# Patient Record
Sex: FEMALE | Race: WHITE | NOT HISPANIC OR LATINO | Employment: FULL TIME | URBAN - NONMETROPOLITAN AREA
[De-identification: names, ages, dates, MRNs, and addresses within clinical notes are randomized per-mention and may not be internally consistent; named-entity substitution may affect disease eponyms.]

---

## 2018-01-12 ENCOUNTER — OFFICE VISIT CONVERTED (OUTPATIENT)
Dept: FAMILY MEDICINE CLINIC | Age: 33
End: 2018-01-12
Attending: NURSE PRACTITIONER

## 2018-03-28 ENCOUNTER — OFFICE VISIT CONVERTED (OUTPATIENT)
Dept: OTOLARYNGOLOGY | Facility: CLINIC | Age: 33
End: 2018-03-28
Attending: OTOLARYNGOLOGY

## 2018-09-20 ENCOUNTER — OFFICE VISIT CONVERTED (OUTPATIENT)
Dept: FAMILY MEDICINE CLINIC | Age: 33
End: 2018-09-20
Attending: NURSE PRACTITIONER

## 2019-07-30 ENCOUNTER — OFFICE VISIT CONVERTED (OUTPATIENT)
Dept: FAMILY MEDICINE CLINIC | Age: 34
End: 2019-07-30
Attending: NURSE PRACTITIONER

## 2019-09-13 ENCOUNTER — HOSPITAL ENCOUNTER (OUTPATIENT)
Dept: OTHER | Facility: HOSPITAL | Age: 34
Discharge: HOME | End: 2019-09-13

## 2019-09-13 LAB
ALBUMIN SERPL-MCNC: 4.4 G/DL (ref 3.5–5)
ALBUMIN/GLOB SERPL: 2 {RATIO} (ref 1.4–2.6)
ALP SERPL-CCNC: 85 U/L (ref 42–98)
ALT SERPL-CCNC: 41 U/L (ref 10–40)
ANION GAP SERPL CALC-SCNC: 19 MMOL/L (ref 8–19)
AST SERPL-CCNC: 24 U/L (ref 15–50)
BASOPHILS # BLD AUTO: 0.08 10*3/UL (ref 0–0.2)
BASOPHILS NFR BLD AUTO: 1.2 % (ref 0–3)
BILIRUB SERPL-MCNC: 0.39 MG/DL (ref 0.2–1.3)
BUN SERPL-MCNC: 14 MG/DL (ref 5–25)
BUN/CREAT SERPL: 16 {RATIO} (ref 6–20)
CALCIUM SERPL-MCNC: 9 MG/DL (ref 8.7–10.4)
CHLORIDE SERPL-SCNC: 108 MMOL/L (ref 99–111)
CHOLEST SERPL-MCNC: 140 MG/DL (ref 107–200)
CHOLEST/HDLC SERPL: 3.3 {RATIO} (ref 3–6)
CONV ABS IMM GRAN: 0.02 10*3/UL (ref 0–0.2)
CONV CO2: 19 MMOL/L (ref 22–32)
CONV IMMATURE GRAN: 0.3 % (ref 0–1.8)
CONV TOTAL PROTEIN: 6.6 G/DL (ref 6.3–8.2)
CREAT UR-MCNC: 0.86 MG/DL (ref 0.5–0.9)
DEPRECATED RDW RBC AUTO: 39.4 FL (ref 36.4–46.3)
EOSINOPHIL # BLD AUTO: 0.63 10*3/UL (ref 0–0.7)
EOSINOPHIL # BLD AUTO: 9.5 % (ref 0–7)
ERYTHROCYTE [DISTWIDTH] IN BLOOD BY AUTOMATED COUNT: 12.6 % (ref 11.7–14.4)
GFR SERPLBLD BASED ON 1.73 SQ M-ARVRAT: >60 ML/MIN/{1.73_M2}
GLOBULIN UR ELPH-MCNC: 2.2 G/DL (ref 2–3.5)
GLUCOSE SERPL-MCNC: 84 MG/DL (ref 65–99)
HCT VFR BLD AUTO: 43 % (ref 37–47)
HDLC SERPL-MCNC: 42 MG/DL (ref 40–60)
HGB BLD-MCNC: 14.3 G/DL (ref 12–16)
LDLC SERPL CALC-MCNC: 84 MG/DL (ref 70–100)
LYMPHOCYTES # BLD AUTO: 1.69 10*3/UL (ref 1–5)
LYMPHOCYTES NFR BLD AUTO: 25.5 % (ref 20–45)
MCH RBC QN AUTO: 28.8 PG (ref 27–31)
MCHC RBC AUTO-ENTMCNC: 33.3 G/DL (ref 33–37)
MCV RBC AUTO: 86.5 FL (ref 81–99)
MONOCYTES # BLD AUTO: 0.42 10*3/UL (ref 0.2–1.2)
MONOCYTES NFR BLD AUTO: 6.3 % (ref 3–10)
NEUTROPHILS # BLD AUTO: 3.79 10*3/UL (ref 2–8)
NEUTROPHILS NFR BLD AUTO: 57.2 % (ref 30–85)
NRBC CBCN: 0 % (ref 0–0.7)
OSMOLALITY SERPL CALC.SUM OF ELEC: 294 MOSM/KG (ref 273–304)
PLATELET # BLD AUTO: 417 10*3/UL (ref 130–400)
PMV BLD AUTO: 8.8 FL (ref 9.4–12.3)
POTASSIUM SERPL-SCNC: 4 MMOL/L (ref 3.5–5.3)
RBC # BLD AUTO: 4.97 10*6/UL (ref 4.2–5.4)
SODIUM SERPL-SCNC: 142 MMOL/L (ref 135–147)
T4 FREE SERPL-MCNC: 1.3 NG/DL (ref 0.9–1.8)
TRIGL SERPL-MCNC: 68 MG/DL (ref 40–150)
TSH SERPL-ACNC: 1.34 M[IU]/L (ref 0.27–4.2)
VLDLC SERPL-MCNC: 14 MG/DL (ref 5–37)
WBC # BLD AUTO: 6.63 10*3/UL (ref 4.8–10.8)

## 2019-09-14 LAB — T3FREE SERPL-MCNC: 3.6 PG/ML (ref 2–4.4)

## 2019-09-26 ENCOUNTER — OFFICE VISIT CONVERTED (OUTPATIENT)
Dept: FAMILY MEDICINE CLINIC | Age: 34
End: 2019-09-26
Attending: NURSE PRACTITIONER

## 2020-01-07 ENCOUNTER — OFFICE VISIT CONVERTED (OUTPATIENT)
Dept: FAMILY MEDICINE CLINIC | Age: 35
End: 2020-01-07
Attending: FAMILY MEDICINE

## 2020-09-08 ENCOUNTER — HOSPITAL ENCOUNTER (OUTPATIENT)
Dept: OTHER | Facility: HOSPITAL | Age: 35
Discharge: HOME | End: 2020-09-08

## 2020-09-08 ENCOUNTER — HOSPITAL ENCOUNTER (OUTPATIENT)
Dept: OTHER | Facility: HOSPITAL | Age: 35
Discharge: HOME | End: 2020-09-08
Attending: NURSE PRACTITIONER

## 2020-09-08 LAB
25(OH)D3 SERPL-MCNC: 50.6 NG/ML (ref 30–100)
ALBUMIN SERPL-MCNC: 4.1 G/DL (ref 3.5–5)
ALBUMIN/GLOB SERPL: 1.6 {RATIO} (ref 1.4–2.6)
ALP SERPL-CCNC: 78 U/L (ref 42–98)
ALT SERPL-CCNC: 43 U/L (ref 10–40)
ANION GAP SERPL CALC-SCNC: 18 MMOL/L (ref 8–19)
AST SERPL-CCNC: 28 U/L (ref 15–50)
BASOPHILS # BLD AUTO: 0.1 10*3/UL (ref 0–0.2)
BASOPHILS NFR BLD AUTO: 1.1 % (ref 0–3)
BILIRUB SERPL-MCNC: 0.52 MG/DL (ref 0.2–1.3)
BUN SERPL-MCNC: 12 MG/DL (ref 5–25)
BUN/CREAT SERPL: 13 {RATIO} (ref 6–20)
CALCIUM SERPL-MCNC: 9 MG/DL (ref 8.7–10.4)
CHLORIDE SERPL-SCNC: 106 MMOL/L (ref 99–111)
CHOLEST SERPL-MCNC: 162 MG/DL (ref 107–200)
CHOLEST/HDLC SERPL: 3.9 {RATIO} (ref 3–6)
CONV ABS IMM GRAN: 0.02 10*3/UL (ref 0–0.2)
CONV CO2: 21 MMOL/L (ref 22–32)
CONV IMMATURE GRAN: 0.2 % (ref 0–1.8)
CONV TOTAL PROTEIN: 6.7 G/DL (ref 6.3–8.2)
CREAT UR-MCNC: 0.95 MG/DL (ref 0.5–0.9)
DEPRECATED RDW RBC AUTO: 39.5 FL (ref 36.4–46.3)
EOSINOPHIL # BLD AUTO: 0.48 10*3/UL (ref 0–0.7)
EOSINOPHIL # BLD AUTO: 5.2 % (ref 0–7)
ERYTHROCYTE [DISTWIDTH] IN BLOOD BY AUTOMATED COUNT: 12.8 % (ref 11.7–14.4)
FERRITIN SERPL-MCNC: 34 NG/ML (ref 10–200)
GFR SERPLBLD BASED ON 1.73 SQ M-ARVRAT: >60 ML/MIN/{1.73_M2}
GLOBULIN UR ELPH-MCNC: 2.6 G/DL (ref 2–3.5)
GLUCOSE SERPL-MCNC: 78 MG/DL (ref 65–99)
HCT VFR BLD AUTO: 45.3 % (ref 37–47)
HDLC SERPL-MCNC: 42 MG/DL (ref 40–60)
HGB BLD-MCNC: 15 G/DL (ref 12–16)
IRON SATN MFR SERPL: 30 % (ref 20–55)
IRON SERPL-MCNC: 132 UG/DL (ref 60–170)
LDLC SERPL CALC-MCNC: 99 MG/DL (ref 70–100)
LYMPHOCYTES # BLD AUTO: 2.6 10*3/UL (ref 1–5)
LYMPHOCYTES NFR BLD AUTO: 28 % (ref 20–45)
MCH RBC QN AUTO: 28.4 PG (ref 27–31)
MCHC RBC AUTO-ENTMCNC: 33.1 G/DL (ref 33–37)
MCV RBC AUTO: 85.8 FL (ref 81–99)
MONOCYTES # BLD AUTO: 0.55 10*3/UL (ref 0.2–1.2)
MONOCYTES NFR BLD AUTO: 5.9 % (ref 3–10)
NEUTROPHILS # BLD AUTO: 5.52 10*3/UL (ref 2–8)
NEUTROPHILS NFR BLD AUTO: 59.6 % (ref 30–85)
NRBC CBCN: 0 % (ref 0–0.7)
OSMOLALITY SERPL CALC.SUM OF ELEC: 291 MOSM/KG (ref 273–304)
PLATELET # BLD AUTO: 377 10*3/UL (ref 130–400)
PMV BLD AUTO: 8.7 FL (ref 9.4–12.3)
POTASSIUM SERPL-SCNC: 4.2 MMOL/L (ref 3.5–5.3)
RBC # BLD AUTO: 5.28 10*6/UL (ref 4.2–5.4)
SODIUM SERPL-SCNC: 141 MMOL/L (ref 135–147)
TIBC SERPL-MCNC: 445 UG/DL (ref 245–450)
TRANSFERRIN SERPL-MCNC: 311 MG/DL (ref 250–380)
TRIGL SERPL-MCNC: 104 MG/DL (ref 40–150)
TSH SERPL-ACNC: 1.38 M[IU]/L (ref 0.27–4.2)
VIT B12 SERPL-MCNC: 492 PG/ML (ref 211–911)
VLDLC SERPL-MCNC: 21 MG/DL (ref 5–37)
WBC # BLD AUTO: 9.27 10*3/UL (ref 4.8–10.8)

## 2020-09-21 ENCOUNTER — OFFICE VISIT CONVERTED (OUTPATIENT)
Dept: FAMILY MEDICINE CLINIC | Age: 35
End: 2020-09-21
Attending: NURSE PRACTITIONER

## 2020-10-21 ENCOUNTER — OFFICE VISIT CONVERTED (OUTPATIENT)
Dept: FAMILY MEDICINE CLINIC | Age: 35
End: 2020-10-21
Attending: NURSE PRACTITIONER

## 2020-12-17 ENCOUNTER — OFFICE VISIT CONVERTED (OUTPATIENT)
Dept: FAMILY MEDICINE CLINIC | Age: 35
End: 2020-12-17
Attending: NURSE PRACTITIONER

## 2021-01-05 ENCOUNTER — HOSPITAL ENCOUNTER (OUTPATIENT)
Dept: OTHER | Facility: HOSPITAL | Age: 36
Discharge: HOME | End: 2021-01-05
Attending: NURSE PRACTITIONER

## 2021-01-06 ENCOUNTER — OFFICE VISIT CONVERTED (OUTPATIENT)
Dept: OTOLARYNGOLOGY | Facility: CLINIC | Age: 36
End: 2021-01-06
Attending: OTOLARYNGOLOGY

## 2021-01-12 ENCOUNTER — HOSPITAL ENCOUNTER (OUTPATIENT)
Dept: ULTRASOUND IMAGING | Facility: HOSPITAL | Age: 36
Discharge: HOME | End: 2021-01-12
Attending: OTOLARYNGOLOGY

## 2021-05-14 VITALS — HEIGHT: 67 IN | BODY MASS INDEX: 32.96 KG/M2 | TEMPERATURE: 97.3 F | WEIGHT: 210 LBS

## 2021-05-14 NOTE — PROGRESS NOTES
Progress Note      Patient Name: Joelle Clement   Patient ID: [id number]   Sex: Female   YOB: 1985    Primary Care Provider: Florence SCHUSTER   Referring Provider: Florence SCHUSTER    Visit Date: January 6, 2021    Provider: Josafat Freire MD   Location: [facility] [place] [place] [facility]   Location Address: [address] [place] [address]  [id number]   Location Phone: [phone]          Chief Complaint     1.  Multinodular thyroid goiter       History Of Present Illness  Joelle Clement is a 35 year old /White female who presents to the office today for an established patient visit.      She presents the clinic today for follow-up regarding multinodular thyroid goiter and enlargement in the size of her thyroid nodules.  I last saw her for this in March 2018, and had planned a 1 year follow-up.  For whatever reason, this did not happen, and she presents today with a recent ultrasound of her thyroid that reveals stable size thyroid with slight increase in the size of her thyroid nodule including a 1.7 x 1 x 1.3 cm lobulated mixed hypo and hyperechoic right thyroid nodule with no internal calcifications.  This was characterized as a T RADS 4 lesion.  There is no adjacent 1.5 x 0.6 x 1.2 cm nodule with no internal calcifications and was also rated as a T RADS 4 nodule.  There are several other subcentimeter nodules.  Biopsy of the above-mentioned nodules was recommended by the radiologist.  Other nodules were felt to be benign.  She denies any significant change in her symptoms and has not had any voice changes throat pain, dysphagia, or fluctuations in her weight or energy levels.  Her TSH level was recently assessed and was within normal limits.  She does have a family history of multinodular thyroid goiters, but no family history of thyroid cancer.       Past Medical History  Depression; Thyroid nodule         Past Surgical  "History  Adenoidectomy; Tonsillectomy; Aimwell Tooth Extraction         Medication List  buspirone 7.5 mg oral tablet; fluoxetine 10 mg oral capsule; Hair, Skin, Nails with Biotin 7.5-7.5-1,250 mg-unit-mcg oral tablet,chewable; ibuprofen 200 mg oral tablet; Linzess 145 mcg oral capsule; phentermine 37.5 mg oral capsule; Wellbutrin  mg oral tablet extended release 24 hr         Allergy List  NO KNOWN DRUG ALLERGIES       Allergies Reconciled  Family Medical History  Family history of cancer         Social History  Tobacco (Never)         Review of Systems  · Constitutional  o Denies  o : fever, night sweats, weight loss  · Eyes  o Denies  o : discharge from eye, impaired vision  · HENT  o Admits  o : *See HPI  · Cardiovascular  o Denies  o : chest pain, irregular heart beats  · Respiratory  o Denies  o : shortness of breath, wheezing, coughing up blood  · Gastrointestinal  o Denies  o : heartburn, reflux, vomiting blood  · Genitourinary  o Denies  o : frequency  · Integument  o Denies  o : rash, skin dryness  · Neurologic  o Denies  o : seizures, loss of balance, loss of consciousness, dizziness  · Endocrine  o Denies  o : cold intolerance, heat intolerance  · Heme-Lymph  o Denies  o : easy bleeding, anemia      Vitals  Date Time BP Position Site L\R Cuff Size HR RR TEMP (F) WT  HT  BMI kg/m2 BSA m2 O2 Sat FR L/min FiO2 HC       01/06/2021 12:55 PM        97.3 210lbs 0oz 5'  7\" 32.89 2.12             Physical Examination  · Constitutional  o Appearance  o : well developed, well-nourished, alert and in no acute distress, voice clear and strong  · Head and Face  o Head  o :   § Inspection  § : no deformities or lesions  o Face  o :   § Inspection  § : No facial lesions; House-Brackmann I/VI bilaterally  § Palpation  § : No TMJ crepitus nor  muscle tenderness bilaterally  · Eyes  o Vision  o :   § Visual Fields  § : Extraocular movements are intact. No spontaneous or gaze-induced " nystagmus.  o Conjunctivae  o : clear  o Sclerae  o : clear  o Pupils and Irises  o : pupils equal, round, and reactive to light.   · Ears, Nose, Mouth and Throat  o Ears  o :   § External Ears  § : appearance within normal limits, no lesions present  § Otoscopic Examination  § : tympanic membrane appearance within normal limits bilaterally without perforations, well-aerated middle ears  § Hearing  § : intact to conversational voice both ears  o Nose  o :   § External Nose  § : appearance normal  § Intranasal Exam  § : mucosa within normal limits, vestibules normal, no intranasal lesions present, septum midline, sinuses non tender to percussion  o Oral Cavity  o :   § Oral Mucosa  § : oral mucosa normal without pallor or cyanosis  § Lips  § : lip appearance normal  § Teeth  § : normal dentition for age  § Gums  § : gums pink, non-swollen, no bleeding present  § Tongue  § : tongue appearance normal; normal mobility  § Palate  § : hard palate normal, soft palate appearance normal with symmetric mobility  o Throat  o :   § Oropharynx  § : no inflammation or lesions present, tonsils within normal limits  § Hypopharynx  § : appearance within normal limits, superior epiglottis within normal limits  § Larynx  § : appearance within normal limits, vocal cords within normal limits, no lesions present  · Neck  o Inspection/Palpation  o : normal appearance, no masses or tenderness, trachea midline; thyroid size normal, nontender, readily palpable thyroid nodules, no overlying skin changes, no lymphadenopathy  · Respiratory  o Respiratory Effort  o : breathing unlabored  o Inspection of Chest  o : normal appearance, no retractions  · Cardiovascular  o Heart  o : regular rate and rhythm  · Lymphatic  o Neck  o : no lymphadenopathy present  o Supraclavicular Nodes  o : no lymphadenopathy present  o Preauricular Nodes  o : no lymphadenopathy present  · Skin and Subcutaneous Tissue  o General Inspection  o : Regarding face and neck  - there are no rashes present, no lesions present, and no areas of discoloration  · Neurologic  o Cranial Nerves  o : cranial nerves II-XII are grossly intact bilaterally  o Gait and Station  o : normal gait, able to stand without diffculty  · Psychiatric  o Judgement and Insight  o : judgment and insight intact  o Mood and Affect  o : mood normal, affect appropriate          Assessment  · Thyroid nodule     241.0/E04.1  · Multinodular goiter     241.1/E04.2      Plan  · Orders  o FNA Thyroid w/ guidance (41196) - 241.1/E04.2, 241.0/E04.1 - 01/06/2021  · Medications  o Medications have been Reconciled  o Transition of Care or Provider Policy  · Instructions  o She presents the clinic today for follow-up regarding multinodular thyroid goiter and enlargement in the size of her thyroid nodules. I last saw her for this in March 2018, and had planned a 1 year follow-up. For whatever reason, this did not happen, and she presents today with a recent ultrasound of her thyroid that reveals stable size thyroid with slight increase in the size of her thyroid nodule including a 1.7 x 1 x 1.3 cm lobulated mixed hypo and hyperechoic right thyroid nodule with no internal calcifications. This was characterized as a T RADS 4 lesion. There is no adjacent 1.5 x 0.6 x 1.2 cm nodule with no internal calcifications and was also rated as a T RADS 4 nodule. There are several other subcentimeter nodules. Biopsy of the above-mentioned nodules was recommended by the radiologist. Other nodules were felt to be benign. She denies any significant change in her symptoms and has not had any voice changes throat pain, dysphagia, or fluctuations in her weight or energy levels. Her TSH level was recently assessed and was within normal limits. She does have a family history of multinodular thyroid goiters, but no family history of thyroid cancer. On exam today the nodules are readily palpable and are soft and mobile. In discussing the findings with  her, I did recommend a second opinion to have the radiology reviewed and if these are truly T RADS 4 nodules, I think it would be technically feasible to biopsy. I have ordered FNA for this. I will see her back in the clinic after the results are in to discuss further management.  o Electronically Identified Patient Education Materials Provided Electronically  · Correspondence  o ENT Letter to Referring MD (Florence SCHUSTER) - 01/06/2021            Electronically Signed by: Josafat Freire MD -Author on January 6, 2021 01:35:13 PM

## 2021-05-16 VITALS — TEMPERATURE: 97.8 F | WEIGHT: 186.5 LBS | HEIGHT: 67 IN | BODY MASS INDEX: 29.27 KG/M2

## 2021-05-18 NOTE — PROGRESS NOTES
Joelle Clement. 1985     Office/Outpatient Visit    Visit Date: Thu, Sep 20, 2018 04:42 pm    Provider: Florence Simmons N.P. (Assistant: Sania Musa LPN)    Location: [facility]        Electronically signed by Florence Simmons N.P. on  09/24/2018 04:55:02 PM                             SUBJECTIVE:        CC:     Ms. Clement is a 32 year old White female.  Preventative Exam;         HPI:         Health checkup noted.  Her last physical exam was 1 year ago.  Her last menstrual period was 08/2018-BCP every 3 months.  Her current method of contraception is birth control pills.  She performs breast self-exams occasionally.    Her last Pap smear was in 4/21/17 and was normal.   Preventative Health updated today.  She is not current with her influenza immunization.  Ms. Clement denies any history of abnormal Pap smears.          Additionally, she presents with history of constipation.  states dealing with constipation for years. She has tried many otc meds without much relief. It will produce small BM's and she feels there is more remaining to be removed. She has tried linzess and could not take on a diaily basis. When she took when off work, states it would work but felt it was a lot of diarrhea. She still felt there was remaining stool to be removed.          With regard to the depression with anxiety, states doing well on med. Here for f/u and refills.      ROS:     CONSTITUTIONAL:  Negative for chills, fatigue, fever, and weight change.      EYES:  Negative for blurred vision.      CARDIOVASCULAR:  Negative for chest pain, orthopnea, paroxysmal nocturnal dyspnea and pedal edema.      RESPIRATORY:  Negative for dyspnea.      GASTROINTESTINAL:  Positive for constipation.   Negative for abdominal pain, diarrhea, nausea or vomiting.      NEUROLOGICAL:  Negative for dizziness, headaches, paresthesias, and weakness.      PSYCHIATRIC:  Positive for feelings of stress.          PMH/FMH/SH:      Last Reviewed on 9/20/2018 05:20 PM by Florence Simmons    Past Medical History:             GYNECOLOGICAL HISTORY:    G0    Problems with menstrual cycles include irregular frequency/duration and heavy bleeding.      Current method of contraception is birth control pills;             PAST MEDICAL HISTORY         Cervical Kyphosis    Pelvic Tilt         Surgical History:         Tonsillectomy + Adenoidectomy; at age 5      cyst jenny neck excised;    wisdom teeth;         Social History:     Occupation: [employer]     Marital Status: Single     Children: None         Tobacco/Alcohol/Supplements:     Last Reviewed on 9/20/2018 05:20 PM by Florence Simmons    Tobacco: She has never smoked.  Non-drinker         Substance Abuse History:     Last Reviewed on 9/20/2018 05:20 PM by Florence Simmons    None         Mental Health History:     Last Reviewed on 9/20/2018 05:20 PM by Florence Simmons        Communicable Diseases (eg STDs):     Last Reviewed on 9/20/2018 05:20 PM by Florence Simmons            Current Problems:     Last Reviewed on 9/20/2018 05:20 PM by Florence Simmons    Obesity, NOS     Lumbar radiculopathy     Back pain     Depression with anxiety     Goiter     Nasal polyps     Fatigue     Hypersomnia     Neck pain         Immunizations:     DTP  (Diphtheria-Tetanus-whole cell Pertussis) 1985     DTP  (Diphtheria-Tetanus-whole cell Pertussis) 1/13/1986     DTP  (Diphtheria-Tetanus-whole cell Pertussis) 3/25/1986     DTP  (Diphtheria-Tetanus-whole cell Pertussis) 4/14/1987     DTP  (Diphtheria-Tetanus-whole cell Pertussis) 4/2/1990     Td adult 11/20/2002     HIB (Haemophilus Influenza Type B) 10/15/1987     Hep B (pedi/adol, 3-dose schedule) 7/21/1997     Hep B (pedi/adol, 3-dose schedule) 8/21/1997     Hep B (pedi/adol, 3-dose schedule) 1/19/1998     Hep A, adult dose 6/11/2018     OPV  Poliovirus, live (oral) 1985     OPV  Poliovirus, live (oral) 1/13/1986     OPV  Poliovirus, live  (oral) 3/24/1986     OPV  Poliovirus, live (oral) 4/14/1987     OPV  Poliovirus, live (oral) 4/2/1990     MMR  (Measles-Mumps-Rubella), live 1/27/1987     MMR  (Measles-Mumps-Rubella), live 4/23/1992     zzFluarix Quadrivalent Preservative Free 3 & older 9/23/2016     zzFluarix Quadrivalent Preservative Free 3 & older 10/24/2017     Adacel (Tdap) 3/30/2011         Allergies:     Last Reviewed on 9/20/2018 05:20 PM by Florence Simmons      No Known Drug Allergies.         Current Medications:     Last Reviewed on 9/20/2018 05:20 PM by Florence Simmons    Linzess 145mcg Capsules Take 1 capsule(s) by mouth daily 30 minutes before the first meal of the day.     Quasense ExtendedCy/30mcg/0.15mg Tablet Take 1 tablet(s) by mouth daily as directed.     Fluoxetine 10mg Tablet Take 1 tablet(s) by mouth daily     Saxenda 18mg/3ml Injection Inject 3 mg subcutaneously daily     Wellbutrin XL 150mg Tablets, Extended Release 1 tab daily     Ibuprofen 200mg Tablet 3-4 tabs 1 x a day PRN     OTC Stool softner.     OTC Laxative PRN         OBJECTIVE:        Vitals:         Current: 9/20/2018 4:45:21 PM    Ht:  5 ft, 6 in;  Wt: 180 lbs;  BMI: 29.1    T: 98.3 F (oral);  BP: 106/72 mm Hg (left arm, sitting);  P: 80 bpm (left arm (BP Cuff), sitting);  sCr: 1 mg/dL;  GFR: 87.96        Exams:     PHYSICAL EXAM:     GENERAL: vital signs recorded - well developed, well nourished;  no apparent distress;     EYES: extraocular movements intact; conjunctiva and cornea are normal; PERRLA;     E/N/T:  normal EACs, TMs, nasal/oral mucosa, teeth, gingiva, and oropharynx;     NECK: range of motion is normal; thyroid is non-palpable;     RESPIRATORY: normal respiratory rate and pattern with no distress; normal breath sounds with no rales, rhonchi, wheezes or rubs;     CARDIOVASCULAR: normal rate; rhythm is regular;  no systolic murmur; no edema;     GASTROINTESTINAL: nontender; normal bowel sounds; no organomegaly;     MUSCULOSKELETAL:  Normal range  of motion, strength and tone;     NEUROLOGICAL:  cranial nerves, motor and sensory function, reflexes, gait and coordination are all intact;     PSYCHIATRIC:  appropriate affect and demeanor; normal speech pattern; grossly normal memory;         ASSESSMENT:           V70.0   Z00.00  Health checkup              DDx:     564.01   K59.00  Constipation              DDx:     300.4   F34.9  Depression with anxiety              DDx:         ORDERS:         Meds Prescribed:       Refill of: Quasense (Ethinyl Estradiol/Levonorgestrel) ExtendedCy/30mcg/0.15mg Tablet Take 1 tablet(s) by mouth daily as directed.  #1 (One) package Refills: 11       Refill of: Linzess (Linaclotide)  145mcg Capsules Take 1 capsule(s) by mouth daily 30 minutes before the first meal of the day.  #30 (Thirty) capsule(s) Refills: 11       Refill of: Fluoxetine (Fluoxetine)  10mg Tablet Take 1 tablet(s) by mouth daily  #30 (Thirty) tablet(s) Refills: 11       Refill of: Wellbutrin XL (Bupropion HCl)  150mg Tablets, Extended Release 1 tab daily  #30 (Thirty) tablet(s) Refills: 11 Samples: Thirty         Procedures Ordered:       REFER  Referral to Specialist or Other Facility  (Send-Out)                   PLAN:          Constipation         REFERRALS:  Referral initiated to a gastroenterologist ( Dr Raven Dowd, [facility]; for evaluation of Constipation; a colonoscopy ).            Prescriptions:       Refill of: Linzess (Linaclotide)  145mcg Capsules Take 1 capsule(s) by mouth daily 30 minutes before the first meal of the day.  #30 (Thirty) capsule(s) Refills: 11           Orders:       REFER  Referral to Specialist or Other Facility  (Send-Out)            Depression with anxiety           Prescriptions:       Refill of: Fluoxetine (Fluoxetine)  10mg Tablet Take 1 tablet(s) by mouth daily  #30 (Thirty) tablet(s) Refills: 11       Refill of: Wellbutrin XL (Bupropion HCl)  150mg Tablets, Extended Release 1 tab daily  #30 (Thirty) tablet(s)  Refills: 11 Samples: Thirty             Other Prescriptions:       Refill of: Quasense (Ethinyl Estradiol/Levonorgestrel) ExtendedCy/30mcg/0.15mg Tablet Take 1 tablet(s) by mouth daily as directed.  #1 (One) package Refills: 11         CHARGE CAPTURE:           Primary Diagnosis:     V70.0 Health checkup            Z00.00    Encntr for general adult medical exam w/o abnormal findings              Orders:          85140   Preventive medicine, established patient, age 18-39 years  (In-House)           564.01 Constipation            K59.00    Constipation, unspecified    300.4 Depression with anxiety            F34.9    Persistent mood [affective] disorder, unspecified        ADDENDUMS:      ____________________________________    Addendum: 09/25/2018 01:37 PM - Francine Malcolm         Visit Note Faxed to:        Raven Dowd  (Gastroenterology); Number [phone]

## 2021-05-18 NOTE — PROGRESS NOTES
Joelle Clement 1985     Office/Outpatient Visit    Visit Date: Fri, Jan 12, 2018 01:50 pm    Provider: Florence Simmons N.P. (Assistant: Sola Mackenzie MA)    Location: [facility]        Electronically signed by Florence Simmons N.P. on  01/16/2018 09:23:46 AM                             SUBJECTIVE:        HPI:         Patient presents with depression with anxiety.  Pt states doing well on meds. Here for f/u and refills.          Obesity: Pt currently on saxenda and doing well. Here for f/u and refills.      ROS:     CONSTITUTIONAL:  Negative for chills, fatigue, fever, and weight change.      EYES:  Negative for blurred vision.      CARDIOVASCULAR:  Negative for chest pain, orthopnea, paroxysmal nocturnal dyspnea and pedal edema.      RESPIRATORY:  Negative for dyspnea.      GASTROINTESTINAL:  Negative for abdominal pain, constipation, diarrhea, nausea and vomiting.      NEUROLOGICAL:  Negative for dizziness, headaches, paresthesias, and weakness.      PSYCHIATRIC:  Negative for anxiety, depression, and sleep disturbances.          PMH/FMH/SH:     Last Reviewed on 1/15/2018 01:51 PM by Florence Simmons    Past Medical History:             GYNECOLOGICAL HISTORY:    G0    Problems with menstrual cycles include irregular frequency/duration and heavy bleeding.      Current method of contraception is birth control pills;             PAST MEDICAL HISTORY         Cervical Kyphosis    Pelvic Tilt         Surgical History:         Tonsillectomy + Adenoidectomy; at age 5      cyst jenny neck excised;    wisdom teeth;         Social History:     Occupation: [employer]     Marital Status: Single     Children: None         Tobacco/Alcohol/Supplements:     Last Reviewed on 1/15/2018 01:51 PM by Florence Simmons    Tobacco: She has never smoked.  Non-drinker         Substance Abuse History:     Last Reviewed on 1/15/2018 01:51 PM by Florence Simmons    None         Mental Health History:     Last  Reviewed on 1/15/2018 01:51 PM by Florence Simmons        Communicable Diseases (eg STDs):     Last Reviewed on 1/15/2018 01:51 PM by Florence Simmons            Current Problems:     Last Reviewed on 1/15/2018 01:51 PM by Florence Simmons    Obesity, NOS     Lumbar radiculopathy     Back pain     Depression with anxiety     Goiter     Nasal polyps     Fatigue     Hypersomnia     Neck pain         Immunizations:     Fluarix Quadrivalent Preservative Free 3 & older 9/23/2016     Fluarix Quadrivalent Preservative Free 3 & older 10/24/2017     Adacel (Tdap) 3/30/2011         Allergies:     Last Reviewed on 1/15/2018 01:51 PM by Florence Simmons      No Known Drug Allergies.         Current Medications:     Last Reviewed on 1/15/2018 01:51 PM by Florence Simmons    Linzess 145mcg Capsules Take 1 capsule(s) by mouth daily 30 minutes before the first meal of the day.     Wellbutrin XL 150mg Tablets, Extended Release 1 tab daily     Fluoxetine 10mg Tablet Take 1 tablet(s) by mouth daily     Quasense ExtendedCy/30mcg/0.15mg Tablet Take 1 tablet(s) by mouth daily as directed.     Augmentin 875mg/125mg Tablet 1 tab bid X 10 days     Diflucan 150mg Tablet Take 1 tablet(s) by mouth on day #1, then 1 tablet on day #3     OTC Hair, Skin and Nail vitamin 3 capsules daily     Ibuprofen 200mg Tablet     Prenatal Multivitamin     Vitamin B12 1,000mcg Tablet     OTC Stool softner.     Saxenda 18mg/3ml Injection Inject 3 mg subcutaneously daily         OBJECTIVE:        Vitals:         Current: 1/15/2018 3:00:46 PM    Ht:  5 ft, 6 in;  Wt: 180 lbs;  BMI: 29.1    T: 97.9 F (oral);  BP: 116/80 mm Hg (left arm, sitting);  P: 88 bpm (left arm (BP Cuff), sitting);  R: 16 bpm        Exams:     PHYSICAL EXAM:     GENERAL: vital signs recorded - well developed, well nourished;  no apparent distress;     EYES: extraocular movements intact; conjunctiva and cornea are normal; PERRLA;     NECK: range of motion is normal; thyroid is  non-palpable;     RESPIRATORY: normal respiratory rate and pattern with no distress; normal breath sounds with no rales, rhonchi, wheezes or rubs;     CARDIOVASCULAR: normal rate; rhythm is regular;  no systolic murmur; no edema;     GASTROINTESTINAL: nontender; normal bowel sounds; no organomegaly;     NEUROLOGICAL:  cranial nerves, motor and sensory function, reflexes, gait and coordination are all intact;     PSYCHIATRIC:  appropriate affect and demeanor; normal speech pattern; grossly normal memory;         ASSESSMENT:           300.4   F34.9  Depression with anxiety              DDx:     278.00   E66.9  Obesity, NOS              DDx:         ORDERS:         Meds Prescribed:       Metformin HCl 500mg Tablet 1 tab tid  #90 (Ninety) tablet(s) Refills: 2       Refill of: Wellbutrin XL (Bupropion HCl) 150mg Tablets, Extended Release 1 tab daily  #30 (Thirty) tablet(s) Refills: 11       Refill of: Fluoxetine 10mg Tablet Take 1 tablet(s) by mouth daily  #30 (Thirty) tablet(s) Refills: 11       Refill of: Saxenda (Liraglutide)  18mg/3ml Injection Inject 3 mg subcutaneously daily  #5 (Five) prefilled pen Refills: 2                 PLAN:          Depression with anxiety           Prescriptions:       Refill of: Wellbutrin XL (Bupropion HCl) 150mg Tablets, Extended Release 1 tab daily  #30 (Thirty) tablet(s) Refills: 11       Refill of: Fluoxetine 10mg Tablet Take 1 tablet(s) by mouth daily  #30 (Thirty) tablet(s) Refills: 11           Patient Education Handouts:       Depression (Depressive Disorder)           Obesity, NOS         FOLLOW-UP: Schedule a follow-up visit in 3 months..            Prescriptions:       Metformin HCl 500mg Tablet 1 tab tid  #90 (Ninety) tablet(s) Refills: 2       Refill of: Saxenda (Liraglutide)  18mg/3ml Injection Inject 3 mg subcutaneously daily  #5 (Five) prefilled pen Refills: 2             Patient Recommendations:        For  Obesity, NOS:     Schedule a follow-up visit in 3 months.                 APPOINTMENT INFORMATION:        Monday Tuesday Wednesday Thursday Friday Saturday Sunday            Time:___________________AM  PM   Date:_____________________             CHARGE CAPTURE:           Primary Diagnosis:     300.4 Depression with anxiety            F34.9    Persistent mood [affective] disorder, unspecified              Orders:          09902   Office/outpatient visit; established patient, level 3  (In-House)           278.00 Obesity, NOS            E66.9    Obesity, unspecified        ADDENDUMS:      ____________________________________    Date: 03/02/2018 11:27 AM    Author: Swati Burgess         Visit Note Faxed to:        Jack Freire  (Otolaryngology); Number [phone]     Health Summary Faxed to:        Jack Freire  (Otolaryngology); Number [phone]

## 2021-05-18 NOTE — PROGRESS NOTES
Joelle Clement  1985     Office/Outpatient Visit    Visit Date: Mon, Sep 21, 2020 11:36 am    Provider: Thu Montez N.P. (Assistant: Elizabeth Rush, )    Location: [facility]        Electronically signed by Thu Montez N.P. on  09/21/2020 02:00:37 PM                             Subjective:        CC: Ms. Clement is a 34 year old White female.  Physical/weight loss, depression;         HPI:           Encounter for general adult medical examination without abnormal findings noted.  Her current method of contraception is birth control pills.   Her last Pap smear was in 4/21/2017, was normal, and HPV normal.   Preventative Health updated today.  She is not current with her Td and influenza immunization.  Tobacco: She has never smoked.            PHQ-9 Depression Screening: Completed form scanned and in chart; Total Score 11 Joelle reports long standing history of depression. This has been worsened recently with concerns over her weight. She has gained about 25 pounds since her last appointment here. She feels unmotivated and doesn't go anywhere except to work much anymore. She has felt increasingly fatigued and down about herself. She had recent lab work performed and that has been reviewed with patient. She has already started making adjustments to try to lose weight herself. Is eating smaller portion sizes and avoiding intake of sweets. She wants to lose weight to help joint pain as well. She has previously taken phentermine many years ago and is interested in trying that again in addition to lifestyle modifications.     ROS:     CONSTITUTIONAL:  Positive for fatigue and unintentional weight gain.   Negative for chills or fever.      EYES:  Negative for eye drainage and eye pain.      E/N/T:  Negative for ear pain and sore throat.      CARDIOVASCULAR:  Negative for chest pain and palpitations.      RESPIRATORY:  Negative for dyspnea and frequent wheezing.      GASTROINTESTINAL:  Negative  for abdominal pain and vomiting.      GENITOURINARY:  Negative for dysuria and frequent urination.      INTEGUMENTARY/BREAST:  Negative for rash.      NEUROLOGICAL:  Negative for dizziness and headaches.      PSYCHIATRIC:  Positive for anxiety, crying spells and depression.   Negative for suicidal thoughts.          Past Medical History / Family History / Social History:         Last Reviewed on 9/21/2020 11:46 AM by Thu Montez    Past Medical History:             GYNECOLOGICAL HISTORY:    G0    Problems with menstrual cycles include irregular frequency/duration and heavy bleeding.      Current method of contraception is birth control pills;             PAST MEDICAL HISTORY         Cervical Kyphosis    Pelvic Tilt         PREVENTIVE HEALTH MAINTENANCE             PAP SMEAR: was last done 4/21/2017 with normal results The next one is due  5 yrs NIL with negative HR-HPV         Surgical History:         Tonsillectomy + Adenoidectomy; at age 5 Other Surgeries    cyst from neck excised;    wisdom teeth;         Family History:     Father: Hypertension; Hyperlipidemia     Mother: Hypertension; Hyperlipidemia;  Hypothyroidism         Social History:     [place]up[place] [employer] [place]     Marital Status: Single     Children: None         Tobacco/Alcohol/Supplements:     Last Reviewed on 9/21/2020 11:36 AM by Elizabeth Rush    Tobacco: She has never smoked.  Non-drinker         Substance Abuse History:     Last Reviewed on 1/07/2020 10:58 AM by Jessica Abarca    None         Mental Health History:     Last Reviewed on 1/07/2020 10:58 AM by Jessica Abarca        Communicable Diseases (eg STDs):     Last Reviewed on 1/07/2020 10:58 AM by Jessica Abarca        Current Problems:     Last Reviewed on 1/07/2020 10:58 AM by Jessica Abarca    Other fatigue    Nontoxic goiter, unspecified    Obesity, unspecified    Radiculopathy, lumbosacral region    Cervicalgia    Low back pain    Constipation, unspecified    Encounter for screening for  depression    Major depressive disorder, single episode, mild    Nasal polyp, unspecified    Other abnormal glucose    Encounter for general adult medical examination without abnormal findings        Immunizations:     DTP  (Diphtheria-Tetanus-whole cell Pertussis) 1985    DTP  (Diphtheria-Tetanus-whole cell Pertussis) 1/13/1986    DTP  (Diphtheria-Tetanus-whole cell Pertussis) 3/25/1986    DTP  (Diphtheria-Tetanus-whole cell Pertussis) 4/14/1987    DTP  (Diphtheria-Tetanus-whole cell Pertussis) 4/2/1990    Td adult 11/20/2002    HIB (Haemophilus Influenza Type B) 10/15/1987    Hep B (pedi/adol, 3-dose schedule) 7/21/1997    Hep B (pedi/adol, 3-dose schedule) 8/21/1997    Hep B (pedi/adol, 3-dose schedule) 1/19/1998    Hep A, adult dose 6/11/2018    Hep A, adult dose 1/4/2019    OPV  Poliovirus, live (oral) 1985    OPV  Poliovirus, live (oral) 1/13/1986    OPV  Poliovirus, live (oral) 3/24/1986    OPV  Poliovirus, live (oral) 4/14/1987    OPV  Poliovirus, live (oral) 4/2/1990    MMR  (Measles-Mumps-Rubella), live 1/27/1987    MMR  (Measles-Mumps-Rubella), live 4/23/1992    zzFluarix Quadrivalent Preservative Free 3 & older 9/23/2016    zzFluarix Quadrivalent Preservative Free 3 & older 10/24/2017    Fluarix pf 10/19/2018    Fluarix pf 10/9/2019    Adacel (Tdap) 3/30/2011        Allergies:     Last Reviewed on 9/21/2020 11:36 AM by Elizabeth Rush    No Known Allergies.        Current Medications:     Last Reviewed on 9/21/2020 11:36 AM by Elizabeth Rush    Fluoxetine 10mg Tablet [Take 1 tablet(s) by mouth daily]    OTC Stool softner.    Quasense ExtendedCy/30mcg/0.15mg Tablet [Take 1 tablet(s) by mouth daily as directed.]    Wellbutrin XL 150mg Tablets, Extended Release [1 tab daily]    Sample of Wellbutrin XL 150mg Tablets, Extended Release [1 tab daily]    Ibuprofen 200mg Tablet [3-4 tabs 1 x a day PRN]    OTC Laxative PRN        Objective:        Vitals:         Historical:     1/7/2020  BP:   116/80 mm Hg (  (left arm, , sitting, );) 1/7/2020  P:   78bpm ( (left arm (BP Cuff), , sitting, );) 1/7/2020  Wt:   188lbs    Current: 9/21/2020 11:39:01 AM    Ht:  5 ft, 6 in;  Wt: 215 lbs;  BMI: 34.7T: 97.3 F (oral);  BP: 115/73 mm Hg (left arm, sitting);  P: 80 bpm (left arm (BP Cuff), sitting);  sCr: 0.95 mg/dL;  GFR: 98.04        Exams:     PHYSICAL EXAM:     GENERAL: well developed, well nourished;  no apparent distress;     EYES: PERRL, EOMI     E/N/T: EARS: external auditory canal normal;  bilateral TMs are normal;  NOSE: normal turbinates; no sinus tenderness; OROPHARYNX: oral mucosa is normal; posterior pharynx shows no exudate;     NECK: range of motion is normal; trachea is midline;     RESPIRATORY: normal respiratory rate and pattern with no distress; normal breath sounds with no rales, rhonchi, wheezes or rubs;     CARDIOVASCULAR: normal rate; rhythm is regular;     GASTROINTESTINAL: nontender; normal bowel sounds; no organomegaly;     MUSCULOSKELETAL: normal gait;     NEUROLOGIC: mental status: alert and oriented x 3; GROSSLY INTACT     PSYCHIATRIC: affect/demeanor: tearful;  normal psychomotor function; normal speech pattern; normal thought and perception;         Lab/Test Results:         Urine temperature: confirmed (09/21/2020),     All urine drug screen levels confirmed negative: yes (09/21/2020),     Date and time of last pill: New script (09/21/2020),     Performed by: kassandra (09/21/2020),     Collection Time: 1315 (09/21/2020),             Assessment:         Z00.00   Encounter for general adult medical examination without abnormal findings       Z13.31   Encounter for screening for depression       Z23   Encounter for immunization       F32.0   Major depressive disorder, single episode, mild       E66.9   Obesity, unspecified           ORDERS:         Meds Prescribed:       [New Rx] FLUoxetine 20 mg oral tablet [take 1 tablet (20 mg) by oral route once daily], #90 (ninety) tablets, Refills: 0 (zero)       [New  Rx] phentermine 37.5 mg oral tablet [take 1 tablet (37.5 mg) by oral route once daily before breakfast], #30 (thirty) tablets, Refills: 0 (zero)         Lab Orders:       71630  Drug test prsmv qual dir optical obs per day  (In-House)              Procedures Ordered:       41165  Adacel  (In-House)              Other Orders:         Depression screen positive and follow up plan documented  (In-House)                      Plan:         Encounter for general adult medical examination without abnormal findingsUTD pap smear. Will receive influenza vaccine through employer. Will update Tdap today.         COUNSELING was provided today regarding the following topics: healthy eating habits, regular exercise, and breast self-exam.          Encounter for screening for depression    MIPS PHQ-9 Depression Screening: Completed form scanned and in chart; Total Score 11 Positive Depression Screen: Pharmacologic intervention initiated/modified           Orders:         Depression screen positive and follow up plan documented  (In-House)              Encounter for immunization        IMMUNIZATIONS given today: Adacel.            Immunizations:       75267  Adacel  (In-House)                Dose (ml): 0.5  Site: right deltoid  Route: intramuscular  Administered by: Elizabeth Rush          : [employer]  Lot #: [id number]  Exp: 11/27/2021          NDC: [address]-44        Major depressive disorder, single episode, mildWill increase her Fluoxetine dose to 20 mg today. She was previously on 40 mg daily prior to Wellbutrin being added.           Prescriptions:       [New Rx] FLUoxetine 20 mg oral tablet [take 1 tablet (20 mg) by oral route once daily], #90 (ninety) tablets, Refills: 0 (zero)         Obesity, unspecifiedWill start phentermine in addition to healthy diet and exercise. F/U one month. Recommend food log.     LABORATORY:  Labs ordered to be performed today include Drug screen.  Controlled substance  documentation: Jermaine reviewed; drug screen performed and appropriate; consent is reviewed and signed and on the chart.  She is aware of risk of addiction on this medication, understands that she will need to follow up for a review every 3 months and her medications will be adjusted or decreased as deemed appropriate at each visit.  No history of drug or alcohol abuse.  No concerns about diversion or abuse. She denies side effects related to the medication.  She is aware that she may be called in for pill counts.  The dosing of this medication will be reviewed on a regular basis and reduced if possible..  Ongoing use of a controlled substance is necessary for this patient to have a normal quality of life           Prescriptions:       [New Rx] phentermine 37.5 mg oral tablet [take 1 tablet (37.5 mg) by oral route once daily before breakfast], #30 (thirty) tablets, Refills: 0 (zero)           Orders:       24106  Drug test prsmv qual dir optical obs per day  (In-House)                  Patient Recommendations:        For  Encounter for general adult medical examination without abnormal findings:        Limit dietary intake of fat (especially saturated fat) and cholesterol.  Eat a variety of foods, including plenty of fruits, vegetables, and grain containg fiber, limit fat intake to 30% of total calories. Balance caloric intake with energy expended.    Maintaining regular physical activity is advised to help prevent heart disease, hypertension, diabetes, and obesity.    You should regularly examine your breasts, easily done while in the shower or with lotion.  Feel and look for differences in consistency from month to month, especially noting knots or lumps, changes in skin appearance, nipple retraction or discharge.              Charge Capture:         Primary Diagnosis:     Z00.00  Encounter for general adult medical examination without abnormal findings           Orders:      87704  Preventive medicine, established  patient, age 18-39 years  (In-House)              Z13.31  Encounter for screening for depression           Orders:        Depression screen positive and follow up plan documented  (In-House)              Z23  Encounter for immunization           Orders:      01049  Adacel  (In-House)              F32.0  Major depressive disorder, single episode, mild     E66.9  Obesity, unspecified           Orders:      47477  Drug test prsmv qual dir optical obs per day  (In-House)                  ADDENDUMS:      ____________________________________    Addendum: 09/24/2020 09:29 AM - Elizabeth Rush        Add  00527 (Immunization admin, single)./atc            97.7

## 2021-05-18 NOTE — PROGRESS NOTES
"Joelle Clement  1985     Office/Outpatient Visit    Visit Date: Tue, Jan 7, 2020 10:49 am    Provider: Jessica Abarca MD (Assistant: Tracey De La Torre MA)    Location: [facility]        Electronically signed by Jessica Abarca MD on  01/07/2020 11:31:09 AM                             Electronically signed by Jessica Abarca MD on  01/07/2020 11:31:12 AM                             Subjective:        CC: Ms. Clement is a 34 year old White female.  Patient presents today with complaints of back pain, also needs medication refills;         HPI: Joelle is here today with complaint of chronic back pain, both low back and neck.  +Lumbar radiculopathy, R>L, in the past, but that is resolved at this time.  She has had pain for 5+ years now.  It occurs daily.  Brought on by any kind of activity -- she can walk less than 30 min without having to stop due to pain.  She gets stiff with sitting down for too long but lying down actually consistently relieves the pain.  No pain when sleeping.  She does use ice packs which help with the muscle tightness.  She has tried multiple meds in the past which did not help including: Robaxin, Flexeril, oxycodone, hydrocodone.  NSAIDs do help relieve the tightness as well as TENS unit and Biofreeze but do not really impact the pain itself.  She has done PT in the past but only went for a couple of visits because of insurance coverage.  She had dry needling there.  That did help her sciatica pain.  MRI L spine in 4/2017 showed T12/L1 moderate herniation impinging on the thecal sac but not the cord.   She has never been to Pain Management.  She has had back pain for the past 9 years.  She remembers getting ready to  go on a trip \"and my back just locked up.  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   She has worked on her posture a lot and her neck pain has improved quite a bit with that.  She has also started having a lot of R " hip pain.        She is on fluoxetine and Wellbutrin for depression and anxiety.  These control her sx well.  She has been on them for years.        She is on Linzess for chronic constipation.  Bowel movements have been regular.          PHQ-9 Depression Screening: Completed form scanned and in chart; Total Score 0     ROS:     CONSTITUTIONAL:  Positive for fatigue.   Negative for fever.      CARDIOVASCULAR:  Negative for chest pain and palpitations.      RESPIRATORY:  Negative for recent cough and dyspnea.      GASTROINTESTINAL:  Positive for constipation.   Negative for abdominal pain, diarrhea, nausea or vomiting.      MUSCULOSKELETAL:  Positive for arthralgias, back pain and joint stiffness.   Negative for myalgias.      NEUROLOGICAL:  Negative for paresthesias and weakness.          Past Medical History / Family History / Social History:         Last Reviewed on 1/07/2020 10:58 AM by Jessica Abarca    Past Medical History:             GYNECOLOGICAL HISTORY:    G0    Problems with menstrual cycles include irregular frequency/duration and heavy bleeding.      Current method of contraception is birth control pills;             PAST MEDICAL HISTORY         Cervical Kyphosis    Pelvic Tilt         Surgical History:         Tonsillectomy + Adenoidectomy; at age 5     cyst jenny neck excised;    wisdom teeth;         Social History:     Occupation: [employer] RN     Marital Status: Single     Children: None         Tobacco/Alcohol/Supplements:     Last Reviewed on 1/07/2020 10:58 AM by Jessica Abarca    Tobacco: She has never smoked.  Non-drinker         Substance Abuse History:     Last Reviewed on 1/07/2020 10:58 AM by Jessica Abarca    None         Mental Health History:     Last Reviewed on 1/07/2020 10:58 AM by Jessica Abarca        Communicable Diseases (eg STDs):     Last Reviewed on 1/07/2020 10:58 AM by Jessica Abarca        Current Problems:     Last Reviewed on 9/20/2018 05:20 PM by Florence Simmons     Other fatigue    Fatigue    Hypersomnia    Nasal polyps    Nontoxic goiter, unspecified    Goiter    Persistent mood [affective] disorder, unspecified    Depression with anxiety    Neck pain    Radiculopathy, lumbosacral region    Cervicalgia    Low back pain    Obesity, unspecified    Back pain    Obesity, NOS    Lumbar radiculopathy    Constipation    Constipation, unspecified        Immunizations:     DTP  (Diphtheria-Tetanus-whole cell Pertussis) 1985    DTP  (Diphtheria-Tetanus-whole cell Pertussis) 1/13/1986    DTP  (Diphtheria-Tetanus-whole cell Pertussis) 3/25/1986    DTP  (Diphtheria-Tetanus-whole cell Pertussis) 4/14/1987    DTP  (Diphtheria-Tetanus-whole cell Pertussis) 4/2/1990    Td adult 11/20/2002    HIB (Haemophilus Influenza Type B) 10/15/1987    Hep B (pedi/adol, 3-dose schedule) 7/21/1997    Hep B (pedi/adol, 3-dose schedule) 8/21/1997    Hep B (pedi/adol, 3-dose schedule) 1/19/1998    Hep A, adult dose 6/11/2018    Hep A, adult dose 1/4/2019    OPV  Poliovirus, live (oral) 1985    OPV  Poliovirus, live (oral) 1/13/1986    OPV  Poliovirus, live (oral) 3/24/1986    OPV  Poliovirus, live (oral) 4/14/1987    OPV  Poliovirus, live (oral) 4/2/1990    MMR  (Measles-Mumps-Rubella), live 1/27/1987    MMR  (Measles-Mumps-Rubella), live 4/23/1992    zzFluarix Quadrivalent Preservative Free 3 & older 9/23/2016    zzFluarix Quadrivalent Preservative Free 3 & older 10/24/2017    Fluarix pf 10/19/2018    Fluarix pf 10/9/2019    Adacel (Tdap) 3/30/2011        Allergies:     Last Reviewed on 9/26/2019 12:22 PM by Tracey De La Torre    No Known Allergies.        Current Medications:     Last Reviewed on 9/26/2019 12:22 PM by Tracey De La Torre    Fluoxetine 10mg Tablet [Take 1 tablet(s) by mouth daily]    OTC Stool softner.    Quasense ExtendedCy/30mcg/0.15mg Tablet [Take 1 tablet(s) by mouth daily as directed.]    Wellbutrin XL 150mg Tablets, Extended Release [1 tab daily]    Sample of Wellbutrin XL 150mg  Tablets, Extended Release [1 tab daily]    Ibuprofen 200mg Tablet [3-4 tabs 1 x a day PRN]    Linzess 72mcg Capsules [Take 1 capsule(s) by mouth daily 30 minutes before the first meal of the day.]    OTC Laxative PRN        Objective:        Vitals:         Current: 1/7/2020 10:50:31 AM    Ht:  5 ft, 6 in;  Wt: 188 lbs;  BMI: 30.3T: 98 F (oral);  BP: 116/80 mm Hg (left arm, sitting);  P: 78 bpm (left arm (BP Cuff), sitting);  sCr: 0.86 mg/dL;  GFR: 102.30        Exams:     PHYSICAL EXAM:     GENERAL: vital signs recorded - well developed, well nourished;  well groomed;  no apparent distress;     EYES: extraocular movements intact; conjunctiva and cornea are normal; PERRLA;     RESPIRATORY: normal respiratory rate and pattern with no distress; normal breath sounds with no rales, rhonchi, wheezes or rubs;     CARDIOVASCULAR: normal rate; rhythm is regular;  no systolic murmur; no edema;     MUSCULOSKELETAL: normal gait; normal overall tone     NEUROLOGIC: mental status: alert and oriented x 3; Reflexes: brachioradialis: 2+; knee jerks: 2+;         Assessment:         M54.5   Low back pain       M54.2   Cervicalgia       K59.00   Constipation, unspecified       F32.0   Major depressive disorder, single episode, mild       Z13.31   Encounter for screening for depression           ORDERS:         Procedures Ordered:       REFER  Referral to Specialist or Other Facility  (Send-Out)              Other Orders:         Depression screen negative  (In-House)                      Plan:         Low back painNine year history of low back pain.  It appears that she never really was able to complete a full course of PT due to insurance and financial restrictions.  Other than that, she  has made quite a few behavioral changes that have really improved her neck pain and sciatica.  Even the back pain is doing better at this moment because she just had a long break from work where she did not have to be up on her feet constantly.   I would like her to continue with the behavioral interventions (ice/heat, working on posture, new bed, new shoes, using her hot tub) but I would really like to get her back into PT to see what they could do for her.  She went to [facility] before and liked them a lot, so she would like to go back there if possible.  Once she starts PT, I want her using a daily dose of ibuprofen 400 mg to assist with anti-inflammation even if she does not feel she needs it for pain.  Will re-evaluate once she has completed that.        REFERRALS:  Referral initiated to physical therapy ( [facility]; for evaluation of low back pain ).  MIPS PHQ-9 Depression Screening: Completed form scanned and in chart; Total Score 0; Negative Depression Screen           Orders:       REFER  Referral to Specialist or Other Facility  (Send-Out)              Depression screen negative  (In-House)              CervicalgiaAs above.        Constipation, unspecifiedStable on Linzess.  Does not need refills today but will in a month or two.  She will let me know.        Major depressive disorder, single episode, mildStable.  No refills needed.            Charge Capture:         Primary Diagnosis:     M54.5  Low back pain           Orders:      72507  Office/outpatient visit; established patient, level 4  (In-House)              Depression screen negative  (In-House)              M54.2  Cervicalgia     K59.00  Constipation, unspecified     F32.0  Major depressive disorder, single episode, mild     Z13.31  Encounter for screening for depression

## 2021-05-18 NOTE — PROGRESS NOTES
Joelle Clement  1985     Office/Outpatient Visit    Visit Date: Wed, Oct 21, 2020 11:21 am    Provider: Thu Montez N.P. (Assistant: Elizabeth Rush, )    Location: [facility]        Electronically signed by Thu Montez N.P. on  10/22/2020 01:02:11 PM                             Subjective:        CC: Ms. Clement is a 35 year old White female.  medication follow up;         HPI: Joelle is here today to follow up on weight loss/obesity. She started phentermine one month ago. She has lost 10 pounds since then. She has been making changes to her diet - watching portion sizes. Drinking protein shakes and eating salads. Less fast food. She admits that she is not doing any exercise at this time. She has history of chronic constipation which has worsened some with the phentermine. Admits that she has been slacking with her water intake.    ROS:     CONSTITUTIONAL:  Negative for chills and fever.      CARDIOVASCULAR:  Negative for chest pain and palpitations.      RESPIRATORY:  Negative for dyspnea and frequent wheezing.      GASTROINTESTINAL:  Positive for constipation.   Negative for abdominal pain.      NEUROLOGICAL:  Negative for dizziness and headaches.          Past Medical History / Family History / Social History:         Last Reviewed on 9/21/2020 11:46 AM by Thu Montez    Past Medical History:             GYNECOLOGICAL HISTORY:    G0    Problems with menstrual cycles include irregular frequency/duration and heavy bleeding.      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  Non-drinker         Substance Abuse History:     Last Reviewed on 1/07/2020 10:58 AM by Jessica Abarca    None         Mental Health History:     Last Reviewed on 1/07/2020 10:58 AM by Jessica Abarca        Communicable Diseases (eg STDs):     Last Reviewed on 1/07/2020 10:58 AM by Jessica Abarca        Current Problems:     Last Reviewed on 1/07/2020 10:58 AM by Jessica Abarca    Other fatigue    Nontoxic goiter, unspecified    Obesity, unspecified    Radiculopathy, lumbosacral region    Cervicalgia    Low back pain    Constipation, unspecified    Encounter for screening for depression    Major depressive disorder, single episode, mild    Nasal polyp, unspecified    Other abnormal glucose    Encounter for general adult medical examination without abnormal findings    Encounter for immunization        Immunizations:     influenza, injectable, quadrivalent, preservative free (FLUARIX QUAD 1808-1222) 10/13/2020    Tdap (ADACEL TDAP) 9/21/2020    DTP  (Diphtheria-Tetanus-whole cell Pertussis) 1985    DTP  (Diphtheria-Tetanus-whole cell Pertussis) 1/13/1986    DTP  (Diphtheria-Tetanus-whole cell Pertussis) 3/25/1986    DTP  (Diphtheria-Tetanus-whole cell Pertussis) 4/14/1987    DTP  (Diphtheria-Tetanus-whole cell Pertussis) 4/2/1990    Td adult 11/20/2002    HIB (Haemophilus Influenza Type B) 10/15/1987    Hep B (pedi/adol, 3-dose schedule) 7/21/1997    Hep B (pedi/adol, 3-dose schedule) 8/21/1997    Hep B (pedi/adol, 3-dose schedule) 1/19/1998    Hep A, adult dose 6/11/2018    Hep A, adult dose 1/4/2019    OPV  Poliovirus, live (oral) 1985    OPV  Poliovirus, live (oral) 1/13/1986    OPV  Poliovirus,  live (oral) 3/24/1986    OPV  Poliovirus, live (oral) 4/14/1987    OPV  Poliovirus, live (oral) 4/2/1990    MMR  (Measles-Mumps-Rubella), live 1/27/1987    MMR  (Measles-Mumps-Rubella), live 4/23/1992    zzFluarix Quadrivalent Preservative Free 3 & older 9/23/2016    zzFluarix Quadrivalent Preservative Free 3 & older 10/24/2017    Fluarix pf 10/19/2018    Fluarix pf 10/9/2019    Adacel (Tdap) 3/30/2011        Allergies:     Last Reviewed on 9/21/2020 11:36 AM by Elizabeth Rush    No Known Allergies.        Current Medications:     Last Reviewed on 9/21/2020 11:36 AM by Elizabeth Rush    OTC Stool softner.     Quasense ExtendedCy/30mcg/0.15mg Tablet [Take 1 tablet(s) by mouth daily as directed.]    Wellbutrin XL 150mg Tablets, Extended Release [1 tab daily]    Sample of Wellbutrin XL 150mg Tablets, Extended Release [1 tab daily]    Ibuprofen 200 mg oral tablet [3-4 tabs 1 x a day PRN]    OTC Laxative PRN     FLUoxetine 20 mg oral tablet [take 1 tablet (20 mg) by oral route once daily]    phentermine 37.5 mg oral tablet [take 1 tablet (37.5 mg) by oral route once daily before breakfast]        Objective:        Vitals:         Historical:     9/21/2020  BP:   115/73 mm Hg ( (left arm, , sitting, );) 9/21/2020  P:   80bpm ( (left arm (BP Cuff), , sitting, );) 9/21/2020  Wt:   215lbs    Current: 10/21/2020 11:22:47 AM    Ht:  5 ft, 6 in;  Wt: 205 lbs;  BMI: 33.1T: 96.5 F (oral);  BP: 122/72 mm Hg (left arm, sitting);  P: 90 bpm (left arm (BP Cuff), sitting);  sCr: 0.95 mg/dL;  GFR: 95.19        Exams:     PHYSICAL EXAM:     GENERAL: well developed, well nourished;  no apparent distress;     RESPIRATORY: normal respiratory rate and pattern with no distress; normal breath sounds with no rales, rhonchi, wheezes or rubs;     CARDIOVASCULAR: normal rate; rhythm is regular;     GASTROINTESTINAL: hypoactive bowel sounds;     NEUROLOGIC: mental status: alert and oriented x 3; GROSSLY INTACT     PSYCHIATRIC: appropriate affect and  kayla;         Assessment:         E66.9   Obesity, unspecified       K59.00   Constipation, unspecified           ORDERS:         Meds Prescribed:       [Refilled] phentermine 37.5 mg oral tablet [take 1 tablet (37.5 mg) by oral route once daily before breakfast], #30 (thirty) tablets, Refills: 0 (zero)                 Plan:         Obesity, unspecifiedContinue lifestyle modifications with health diet. Start exercise such as walking a few times per week. Increase water intake. Let me know how doing in one month and weight.    Controlled substance documentation: Jermaine reviewed; prior drug screen consistent; consent is reviewed and signed and on the chart.  She is aware of risk of addiction on this medication, understands that she will need to follow up for a review every 3 months and her medications will be adjusted or decreased as deemed appropriate at each visit.  No history of drug or alcohol abuse.  No concerns about diversion or abuse. She denies side effects related to the medication.  She is aware that she may be called in for pill counts.  The dosing of this medication will be reviewed on a regular basis and reduced if possible..  Ongoing use of a controlled substance is necessary for this patient to have a normal quality of life           Prescriptions:       [Refilled] phentermine 37.5 mg oral tablet [take 1 tablet (37.5 mg) by oral route once daily before breakfast], #30 (thirty) tablets, Refills: 0 (zero)         Constipation, unspecifiedStart exercise. Increase water intake. Increase fiber in diet. May take stool softener/laxative as needed.             Charge Capture:         Primary Diagnosis:     E66.9  Obesity, unspecified           Orders:      88477  Office/outpatient visit; established patient, level 3  (In-House)              K59.00  Constipation, unspecified

## 2021-05-18 NOTE — PROGRESS NOTES
Joelle Clement ALYSHA 1985     Office/Outpatient Visit    Visit Date: Thu, Sep 26, 2019 12:21 pm    Provider: Florence Simmons N.P. (Assistant: Tracey De La Torre MA)    Location: [facility]        Electronically signed by Florence Simmons N.P. on  09/26/2019 06:03:58 PM                             SUBJECTIVE:        CC:     Ms. Clement is a 34 year old White female.  Patient presents today for physical exam;         HPI:         Ms. Clement presents with health checkup.  Her last physical exam was 1 year ago.  Her last menstrual period was 9/2019.  She is not currently using any form of contraception.  She performs breast self-exams monthly.    Preventative Health updated today.  She is not current with her influenza immunization.  Tobacco: She has never smoked.          PHQ-9 Depression Screening: Completed form scanned and in chart; Total Score 0     ROS:     CONSTITUTIONAL:  Negative for chills, fatigue, fever, and weight change.      EYES:  Negative for blurred vision.      CARDIOVASCULAR:  Negative for chest pain, orthopnea, paroxysmal nocturnal dyspnea and pedal edema.      RESPIRATORY:  Negative for dyspnea.      GASTROINTESTINAL:  Negative for abdominal pain, constipation, diarrhea, nausea and vomiting.      NEUROLOGICAL:  Negative for dizziness, headaches, paresthesias, and weakness.      PSYCHIATRIC:  Negative for anxiety, depression, and sleep disturbances.          PMH/FMH/SH:     Last Reviewed on 9/20/2018 05:20 PM by Florence Simmons    Past Medical History:             GYNECOLOGICAL HISTORY:    G0    Problems with menstrual cycles include irregular frequency/duration and heavy bleeding.      Current method of contraception is birth control pills;             PAST MEDICAL HISTORY         Cervical Kyphosis    Pelvic Tilt         Surgical History:         Tonsillectomy + Adenoidectomy; at age 5      cyst jenny neck excised;    wisdom teeth;         Social History:     Occupation: [employer] RN     Marital Status: Single     Children: None         Tobacco/Alcohol/Supplements:     Last Reviewed on 7/30/2019 04:04 PM by Tracey De La Torre    Tobacco: She has never smoked.  Non-drinker         Substance Abuse History:     Last Reviewed on 9/20/2018 05:20 PM by Florence Simmons    None         Mental Health History:     Last Reviewed on 9/20/2018 05:20 PM by Florence Simmons        Communicable Diseases (eg STDs):     Last Reviewed on 9/20/2018 05:20 PM by Florence Simmons            Current Problems:     Last Reviewed on 9/20/2018 05:20 PM by Florence Simmons    Constipation     Obesity, NOS     Lumbar radiculopathy     Back pain     Depression with anxiety     Goiter     Nasal polyps     Fatigue     Hypersomnia     Neck pain         Immunizations:     DTP  (Diphtheria-Tetanus-whole cell Pertussis) 1985     DTP  (Diphtheria-Tetanus-whole cell Pertussis) 1/13/1986     DTP  (Diphtheria-Tetanus-whole cell Pertussis) 3/25/1986     DTP  (Diphtheria-Tetanus-whole cell Pertussis) 4/14/1987     DTP  (Diphtheria-Tetanus-whole cell Pertussis) 4/2/1990     Td adult 11/20/2002     HIB (Haemophilus Influenza Type B) 10/15/1987     Hep B (pedi/adol, 3-dose schedule) 7/21/1997     Hep B (pedi/adol, 3-dose schedule) 8/21/1997     Hep B (pedi/adol, 3-dose schedule) 1/19/1998     Hep A, adult dose 6/11/2018     Hep A, adult dose 1/4/2019     OPV  Poliovirus, live (oral) 1985     OPV  Poliovirus, live (oral) 1/13/1986     OPV  Poliovirus, live (oral) 3/24/1986     OPV  Poliovirus, live (oral) 4/14/1987     OPV  Poliovirus, live (oral) 4/2/1990     MMR  (Measles-Mumps-Rubella), live 1/27/1987     MMR  (Measles-Mumps-Rubella), live 4/23/1992     zzFluarix Quadrivalent Preservative Free 3 & older 9/23/2016     zzFluarix Quadrivalent Preservative Free 3 & older 10/24/2017     Fluarix pf 10/19/2018     Adacel (Tdap) 3/30/2011         Allergies:     Last Reviewed on 7/30/2019 04:03 PM by Tracey De La Torre  Known Drug Allergies.         Current Medications:     Last Reviewed on 7/30/2019 04:03 PM by Tracey De La Torre    Fluoxetine 10mg Tablet Take 1 tablet(s) by mouth daily     Quasense ExtendedCy/30mcg/0.15mg Tablet Take 1 tablet(s) by mouth daily as directed.     Wellbutrin XL 150mg Tablets, Extended Release 1 tab daily     Robaxin 500mg Tablets 1 tab HS PRN     Linzess 72mcg Capsules Take 1 capsule(s) by mouth daily 30 minutes before the first meal of the day.     OTC Laxative PRN     Ibuprofen 200mg Tablet 3-4 tabs 1 x a day PRN     OTC Stool softner.         OBJECTIVE:        Vitals:         Current: 9/26/2019 12:22:33 PM    Ht:  5 ft, 6 in;  Wt: 185 lbs;  WC: 43 inches;  BMI: 29.9    T: 98.2 F (oral);  BP: 99/73 mm Hg (left arm, sitting);  P: 70 bpm (left arm (BP Cuff), sitting);  sCr: 0.86 mg/dL;  GFR: 101.60        Exams:     PHYSICAL EXAM:     GENERAL: vital signs recorded - well developed, well nourished;  no apparent distress;     EYES: extraocular movements intact; conjunctiva and cornea are normal; PERRLA;     E/N/T:  normal EACs, TMs, nasal/oral mucosa, teeth, gingiva, and oropharynx;     NECK: range of motion is normal; thyroid is non-palpable;     RESPIRATORY: normal respiratory rate and pattern with no distress; normal breath sounds with no rales, rhonchi, wheezes or rubs;     CARDIOVASCULAR: normal rate; rhythm is regular;  no systolic murmur; no edema;     GASTROINTESTINAL: nontender; normal bowel sounds; no organomegaly;     NEUROLOGICAL:  cranial nerves, motor and sensory function, reflexes, gait and coordination are all intact;     PSYCHIATRIC:  appropriate affect and demeanor; normal speech pattern; grossly normal memory;         ASSESSMENT:           V70.0   Z00.00  Health checkup              DDx:     V79.0   Z13.31  Screening for depression              DDx:         ORDERS:         Other Orders:         Depression screen negative  (In-House)                   PLAN:          Health checkup      MIPS Negative Depression Screen           Orders:         Depression screen negative  (In-House)               CHARGE CAPTURE:           Primary Diagnosis:     V70.0 Health checkup            Z00.00    Encounter for general adult medical examination without abnormal findings              Orders:          [id number]   Preventive medicine, established patient, age 18-39 years  (In-House)                Depression screen negative  (In-House)           V79.0 Screening for depression            Z13.31    Encounter for screening for depression

## 2021-05-18 NOTE — PROGRESS NOTES
Joelle Clement  1985     Office/Outpatient Visit    Visit Date: Thu, Dec 17, 2020 11:09 am    Provider: Thu Montez N.P. (Assistant: Karl Berkowitz MA)    Location: [facility]        Electronically signed by Thu Montez N.P. on  12/21/2020 05:18:57 PM                             Subjective:        CC: Ms. Clement is a 35 year old White female.  Anxiety;         HPI: Joelle is here today to discuss her anxiety and depression. She has had increased anxiety over the past few months. Current medications include Prozac and Wellbutrin. Has been on Prozac since she was in high school and Wellbutrin was added 4 years ago. Her dose of Prozac was increased earlier this year without improvement. Symptoms include feeling like heart jumping out of her chest, trouble sleeping, increased worrying, biting nails. She was only briefly on phentermine as she stopped taking due to constipation so does not feel that this has anything to do with her symptoms. Denies suicidal ideation.    ROS:     CONSTITUTIONAL:  Negative for chills and fever.      CARDIOVASCULAR:  Negative for chest pain and palpitations.      RESPIRATORY:  Negative for dyspnea and frequent wheezing.      GENITOURINARY:  Negative for dysuria and frequent urination.      NEUROLOGICAL:  Negative for dizziness and headaches.      PSYCHIATRIC:  Positive for anxiety and sleep disturbance.   Negative for suicidal thoughts.          Past Medical History / Family History / Social History:         Last Reviewed on 9/21/2020 11:46 AM by Thu Montez    Past Medical History:             GYNECOLOGICAL HISTORY:    G0    Problems with menstrual cycles include irregular frequency/duration and heavy bleeding.      Current method of contraception is birth control pills;             PAST MEDICAL HISTORY         Cervical Kyphosis    Pelvic Tilt         PREVENTIVE HEALTH MAINTENANCE             PAP SMEAR: was last done 4/21/2017 with normal results The  next one is due  5 yrs NIL with negative HR-HPV         Surgical History:         Tonsillectomy + Adenoidectomy; at age 5 Other Surgeries    cyst from neck excised;    wisdom teeth;         Family History:     Father: Hypertension; Hyperlipidemia     Mother: Hypertension; Hyperlipidemia;  Hypothyroidism         Social History:     Occupation: [employer] [place]     Marital Status: Single     Children: None         Tobacco/Alcohol/Supplements:     Last Reviewed on 9/21/2020 11:36 AM by Elizabeth Rush    Tobacco: She has never smoked.  Non-drinker         Substance Abuse History:     Last Reviewed on 1/07/2020 10:58 AM by Jessica Abarca         Mental Health History:     Last Reviewed on 1/07/2020 10:58 AM by Jessica Abarca        Communicable Diseases (eg STDs):     Last Reviewed on 1/07/2020 10:58 AM by Jessica Abarca        Current Problems:     Last Reviewed on 1/07/2020 10:58 AM by Jessica Abarca    Other fatigue    Nontoxic goiter, unspecified    Obesity, unspecified    Radiculopathy, lumbosacral region    Cervicalgia    Low back pain    Constipation, unspecified    Major depressive disorder, single episode, mild    Nasal polyp, unspecified    Encounter for screening for depression    Other abnormal glucose    Encounter for general adult medical examination without abnormal findings    Encounter for immunization    Nontoxic single thyroid nodule        Immunizations:     influenza, injectable, quadrivalent, preservative free (FLUARIX QUAD 3795-6125) 10/13/2020    Tdap (ADACEL TDAP) 9/21/2020    DTP  (Diphtheria-Tetanus-whole cell Pertussis) 1985    DTP  (Diphtheria-Tetanus-whole cell Pertussis) 1/13/1986    DTP  (Diphtheria-Tetanus-whole cell Pertussis) 3/25/1986    DTP  (Diphtheria-Tetanus-whole cell Pertussis) 4/14/1987    DTP  (Diphtheria-Tetanus-whole cell Pertussis) 4/2/1990    Td adult 11/20/2002    HIB (Haemophilus Influenza Type B) 10/15/1987    Hep B (pedi/adol, 3-dose schedule) 7/21/1997    Hep B  (pedi/adol, 3-dose schedule) 8/21/1997    Hep B (pedi/adol, 3-dose schedule) 1/19/1998    Hep A, adult dose 6/11/2018    Hep A, adult dose 1/4/2019    OPV  Poliovirus, live (oral) 1985    OPV  Poliovirus, live (oral) 1/13/1986    OPV  Poliovirus, live (oral) 3/24/1986    OPV  Poliovirus, live (oral) 4/14/1987    OPV  Poliovirus, live (oral) 4/2/1990    MMR  (Measles-Mumps-Rubella), live 1/27/1987    MMR  (Measles-Mumps-Rubella), live 4/23/1992    zzFluarix Quadrivalent Preservative Free 3 & older 9/23/2016    zzFluarix Quadrivalent Preservative Free 3 & older 10/24/2017    Fluarix pf 10/19/2018    Fluarix pf 10/9/2019    Adacel (Tdap) 3/30/2011        Allergies:     Last Reviewed on 9/21/2020 11:36 AM by Elizabeth Rush    No Known Allergies.        Current Medications:     Last Reviewed on 9/21/2020 11:36 AM by Elizabeth Rush    OTC Stool softner.     Quasense ExtendedCy/30mcg/0.15mg Tablet [Take 1 tablet(s) by mouth daily as directed.]    Wellbutrin  mg oral Tablet, Extended Release 24 hr [1 tab daily]    Ibuprofen 200 mg oral tablet [3-4 tabs 1 x a day PRN]    OTC Laxative PRN     FLUoxetine 20 mg oral tablet [take 1 tablet (20 mg) by oral route once daily]    phentermine 37.5 mg oral tablet [take 1 tablet (37.5 mg) by oral route once daily before breakfast]        Objective:        Vitals:         Current: 12/17/2020 11:12:57 AM    Ht:  5 ft, 6 inT: 97.5 F (oral);  BP: 121/76 mm Hg (left arm, sitting);  P: 81 bpm (left arm (BP Cuff), sitting);  sCr: 0.95 mg/dL;  GFR: 81.47        Exams:     PHYSICAL EXAM:     GENERAL: well developed, well nourished;  no apparent distress;     RESPIRATORY: normal respiratory rate and pattern with no distress; normal breath sounds with no rales, rhonchi, wheezes or rubs;     CARDIOVASCULAR: normal rate; rhythm is regular;     MUSCULOSKELETAL: normal gait;     NEUROLOGIC: mental status: alert and oriented x 3; GROSSLY INTACT     PSYCHIATRIC: affect/demeanor: tearful;  normal  psychomotor function; normal speech pattern; normal thought and perception;         Assessment:         F34.1   Dysthymic disorder           ORDERS:         Meds Prescribed:       [New Rx] busPIRone 7.5 mg oral tablet [take 1 tablet (7.5 mg) by oral route 2 times per day], #60 (sixty) tablets, Refills: 2 (two)                 Plan:         Dysthymic disorderWill add low dose buspirone. Continue Prozac and Wellbutrin at this time. May wean off Wellbutrin depending on symptom progression. Will let me know how she is doing and medications will be adjusted accordingly.         FOLLOW-UP: Chronic visit follow up           Prescriptions:       [New Rx] busPIRone 7.5 mg oral tablet [take 1 tablet (7.5 mg) by oral route 2 times per day], #60 (sixty) tablets, Refills: 2 (two)             Charge Capture:         Primary Diagnosis:     F34.1  Dysthymic disorder           Orders:      [id number]  Office/outpatient visit; established patient, level 3  (In-House)

## 2021-05-18 NOTE — PROGRESS NOTES
Joelle Clement CHRISTOPHER. 1985     Office/Outpatient Visit    Visit Date: Tue, Jul 30, 2019 04:01 pm    Provider: Florence Simmons N.P. (Assistant: Tracey De La Torre MA)    Location: [facility]        Electronically signed by Florence Simmons N.P. on  08/02/2019 12:15:23 PM                             SUBJECTIVE:        CC:     Ms. Clement is a 33 year old White female.  presents today due to complaints of back pain         HPI:         Patient complains of back pain.  Other details: Pt states chronic low back pain with a flare to her hips. She has had pain x 1 month. Has been to chiropractor x 5 times. States feeling good while there but then pain returns. She was told her xrays showed a misalignment. States sitting feels good. When she gets up and starts moving she will lock up. Has trouble getting into the car. She will rest and then feel better. Has appt for massage on Aug 5th..      ROS:     CONSTITUTIONAL:  Negative for chills, fatigue, fever, and weight change.      EYES:  Negative for blurred vision.      CARDIOVASCULAR:  Negative for chest pain, orthopnea, paroxysmal nocturnal dyspnea and pedal edema.      RESPIRATORY:  Negative for dyspnea.      GASTROINTESTINAL:  Negative for abdominal pain, constipation, diarrhea, nausea and vomiting.      MUSCULOSKELETAL:  Positive for back pain.      NEUROLOGICAL:  Negative for dizziness, headaches, paresthesias, and weakness.      PSYCHIATRIC:  Negative for anxiety, depression, and sleep disturbances.          PMH/FMH/SH:     Last Reviewed on 9/20/2018 05:20 PM by Florence Simmons    Past Medical History:             GYNECOLOGICAL HISTORY:    G0    Problems with menstrual cycles include irregular frequency/duration and heavy bleeding.      Current method of contraception is birth control pills;             PAST MEDICAL HISTORY         Cervical Kyphosis    Pelvic Tilt         Surgical History:         Tonsillectomy + Adenoidectomy; at age 5      cyst jenny  neck excised;    wisdom teeth;         Social History:     Occupation: [employer]     Marital Status: Single     Children: None         Tobacco/Alcohol/Supplements:     Last Reviewed on 9/20/2018 05:20 PM by Florence Simmons    Tobacco: She has never smoked.  Non-drinker         Substance Abuse History:     Last Reviewed on 9/20/2018 05:20 PM by Florence Simmons    None         Mental Health History:     Last Reviewed on 9/20/2018 05:20 PM by Florence Simmons        Communicable Diseases (eg STDs):     Last Reviewed on 9/20/2018 05:20 PM by Florence Simmons            Current Problems:     Last Reviewed on 9/20/2018 05:20 PM by Florence Simmons    Constipation     Obesity, NOS     Lumbar radiculopathy     Back pain     Depression with anxiety     Goiter     Nasal polyps     Fatigue     Hypersomnia     Neck pain         Immunizations:     DTP  (Diphtheria-Tetanus-whole cell Pertussis) 1985     DTP  (Diphtheria-Tetanus-whole cell Pertussis) 1/13/1986     DTP  (Diphtheria-Tetanus-whole cell Pertussis) 3/25/1986     DTP  (Diphtheria-Tetanus-whole cell Pertussis) 4/14/1987     DTP  (Diphtheria-Tetanus-whole cell Pertussis) 4/2/1990     Td adult 11/20/2002     HIB (Haemophilus Influenza Type B) 10/15/1987     Hep B (pedi/adol, 3-dose schedule) 7/21/1997     Hep B (pedi/adol, 3-dose schedule) 8/21/1997     Hep B (pedi/adol, 3-dose schedule) 1/19/1998     Hep A, adult dose 6/11/2018     Hep A, adult dose 1/4/2019     OPV  Poliovirus, live (oral) 1985     OPV  Poliovirus, live (oral) 1/13/1986     OPV  Poliovirus, live (oral) 3/24/1986     OPV  Poliovirus, live (oral) 4/14/1987     OPV  Poliovirus, live (oral) 4/2/1990     MMR  (Measles-Mumps-Rubella), live 1/27/1987     MMR  (Measles-Mumps-Rubella), live 4/23/1992     zzFluarix Quadrivalent Preservative Free 3 & older 9/23/2016     zzFluarix Quadrivalent Preservative Free 3 & older 10/24/2017     Fluarix pf 10/19/2018     Adacel (Tdap) 3/30/2011          Allergies:     Last Reviewed on 9/20/2018 05:20 PM by Florence Simmons      No Known Drug Allergies.         Current Medications:     Last Reviewed on 9/20/2018 05:20 PM by Florence Simmons    Fluoxetine 10mg Tablet Take 1 tablet(s) by mouth daily     Quasense ExtendedCy/30mcg/0.15mg Tablet Take 1 tablet(s) by mouth daily as directed.     Wellbutrin XL 150mg Tablets, Extended Release 1 tab daily     Linzess 72mcg Capsules Take 1 capsule(s) by mouth daily 30 minutes before the first meal of the day.     OTC Laxative PRN     Ibuprofen 200mg Tablet 3-4 tabs 1 x a day PRN     OTC Stool softner.         OBJECTIVE:        Vitals:         Current: 7/30/2019 4:05:03 PM    Ht:  5 ft, 6 in;  Wt: 185 lbs;  BMI: 29.9    T: 98 F (oral);  BP: 124/79 mm Hg (left arm, sitting);  P: 84 bpm (left arm (BP Cuff), sitting);  sCr: 1 mg/dL;  GFR: 88.18        Exams:     PHYSICAL EXAM:     GENERAL: vital signs recorded - well developed, well nourished;  no apparent distress;     EYES: extraocular movements intact; conjunctiva and cornea are normal; PERRLA;     NECK: range of motion is normal; thyroid is non-palpable;     RESPIRATORY: normal respiratory rate and pattern with no distress; normal breath sounds with no rales, rhonchi, wheezes or rubs;     CARDIOVASCULAR: normal rate; rhythm is regular;  no systolic murmur; no edema;     GASTROINTESTINAL: nontender; normal bowel sounds; no organomegaly;     MUSCULOSKELETAL: Low back tenderness, limited ROM;     NEUROLOGICAL:  cranial nerves, motor and sensory function, reflexes, gait and coordination are all intact;     PSYCHIATRIC:  appropriate affect and demeanor; normal speech pattern; grossly normal memory;         Procedures:     Back pain     1. Kenalog 60 mg given IM in the right hip; administered by AS;  lot number [id number]; expires 03/2021             ASSESSMENT:           724.5   M54.5  Back pain              DDx:         ORDERS:         Meds Prescribed:       Robaxin  (Methocarbamol) 500mg Tablet 1 tab HS PRN  #20 (Twenty) tablet(s) Refills: 1         Other Orders:       [id number]  Therapeutic injection  (In-House)           Kenalog, per 10 mg (x6)                 PLAN:          Back pain Rx alternatives med sent.     Steroids Kenalog 60 mg 1.5 ml           Prescriptions:       Robaxin (Methocarbamol) 500mg Tablet 1 tab HS PRN  #20 (Twenty) tablet(s) Refills: 1           Orders:       [id number]  Therapeutic injection  (In-House)                     Kenalog, per 10 mg (x6)             CHARGE CAPTURE:           Primary Diagnosis:     724.5 Back pain            M54.5    Low back pain              Orders:          09093   Office/outpatient visit; established patient, level 3  (In-House)             16138   Therapeutic injection  (In-House)                                           Kenalog, per 10 mg (x6)

## 2021-07-01 VITALS
TEMPERATURE: 97.9 F | HEIGHT: 66 IN | DIASTOLIC BLOOD PRESSURE: 80 MMHG | RESPIRATION RATE: 16 BRPM | WEIGHT: 180 LBS | BODY MASS INDEX: 28.93 KG/M2 | SYSTOLIC BLOOD PRESSURE: 116 MMHG | HEART RATE: 88 BPM

## 2021-07-01 VITALS
HEIGHT: 66 IN | TEMPERATURE: 98.3 F | BODY MASS INDEX: 28.93 KG/M2 | DIASTOLIC BLOOD PRESSURE: 72 MMHG | HEART RATE: 80 BPM | WEIGHT: 180 LBS | SYSTOLIC BLOOD PRESSURE: 106 MMHG

## 2021-07-01 VITALS
BODY MASS INDEX: 29.73 KG/M2 | WEIGHT: 185 LBS | SYSTOLIC BLOOD PRESSURE: 124 MMHG | HEART RATE: 84 BPM | HEIGHT: 66 IN | DIASTOLIC BLOOD PRESSURE: 79 MMHG | TEMPERATURE: 98 F

## 2021-07-01 VITALS
WEIGHT: 185 LBS | BODY MASS INDEX: 29.73 KG/M2 | HEART RATE: 70 BPM | SYSTOLIC BLOOD PRESSURE: 99 MMHG | DIASTOLIC BLOOD PRESSURE: 73 MMHG | HEIGHT: 66 IN | TEMPERATURE: 98.2 F

## 2021-07-02 VITALS
DIASTOLIC BLOOD PRESSURE: 73 MMHG | BODY MASS INDEX: 34.55 KG/M2 | SYSTOLIC BLOOD PRESSURE: 115 MMHG | TEMPERATURE: 97.3 F | HEART RATE: 80 BPM | HEIGHT: 66 IN | WEIGHT: 215 LBS

## 2021-07-02 VITALS
BODY MASS INDEX: 30.22 KG/M2 | SYSTOLIC BLOOD PRESSURE: 116 MMHG | WEIGHT: 188 LBS | DIASTOLIC BLOOD PRESSURE: 80 MMHG | HEART RATE: 78 BPM | HEIGHT: 66 IN | TEMPERATURE: 98 F

## 2021-07-02 VITALS
HEIGHT: 66 IN | HEART RATE: 81 BPM | TEMPERATURE: 97.5 F | SYSTOLIC BLOOD PRESSURE: 121 MMHG | DIASTOLIC BLOOD PRESSURE: 76 MMHG | BODY MASS INDEX: 33.09 KG/M2

## 2021-07-02 VITALS
WEIGHT: 205 LBS | TEMPERATURE: 96.5 F | HEART RATE: 90 BPM | DIASTOLIC BLOOD PRESSURE: 72 MMHG | SYSTOLIC BLOOD PRESSURE: 122 MMHG | HEIGHT: 66 IN | BODY MASS INDEX: 32.95 KG/M2

## 2021-09-24 ENCOUNTER — TRANSCRIBE ORDERS (OUTPATIENT)
Dept: ADMINISTRATIVE | Facility: HOSPITAL | Age: 36
End: 2021-09-24

## 2021-09-24 ENCOUNTER — LAB (OUTPATIENT)
Dept: LAB | Facility: HOSPITAL | Age: 36
End: 2021-09-24

## 2021-09-24 DIAGNOSIS — Z00.00 WELLNESS EXAMINATION: ICD-10-CM

## 2021-09-24 DIAGNOSIS — Z00.00 WELLNESS EXAMINATION: Primary | ICD-10-CM

## 2021-09-24 LAB
BASOPHILS # BLD AUTO: 0.07 10*3/MM3 (ref 0–0.2)
BASOPHILS NFR BLD AUTO: 1 % (ref 0–1.5)
DEPRECATED RDW RBC AUTO: 40.2 FL (ref 37–54)
EOSINOPHIL # BLD AUTO: 0.65 10*3/MM3 (ref 0–0.4)
EOSINOPHIL NFR BLD AUTO: 9.4 % (ref 0.3–6.2)
ERYTHROCYTE [DISTWIDTH] IN BLOOD BY AUTOMATED COUNT: 13.1 % (ref 12.3–15.4)
HCT VFR BLD AUTO: 43.7 % (ref 34–46.6)
HGB BLD-MCNC: 14.8 G/DL (ref 12–15.9)
IMM GRANULOCYTES # BLD AUTO: 0.01 10*3/MM3 (ref 0–0.05)
IMM GRANULOCYTES NFR BLD AUTO: 0.1 % (ref 0–0.5)
LYMPHOCYTES # BLD AUTO: 1.76 10*3/MM3 (ref 0.7–3.1)
LYMPHOCYTES NFR BLD AUTO: 25.4 % (ref 19.6–45.3)
MCH RBC QN AUTO: 28.2 PG (ref 26.6–33)
MCHC RBC AUTO-ENTMCNC: 33.9 G/DL (ref 31.5–35.7)
MCV RBC AUTO: 83.4 FL (ref 79–97)
MONOCYTES # BLD AUTO: 0.39 10*3/MM3 (ref 0.1–0.9)
MONOCYTES NFR BLD AUTO: 5.6 % (ref 5–12)
NEUTROPHILS NFR BLD AUTO: 4.05 10*3/MM3 (ref 1.7–7)
NEUTROPHILS NFR BLD AUTO: 58.5 % (ref 42.7–76)
PLATELET # BLD AUTO: 389 10*3/MM3 (ref 140–450)
PMV BLD AUTO: 8.5 FL (ref 6–12)
RBC # BLD AUTO: 5.24 10*6/MM3 (ref 3.77–5.28)
WBC # BLD AUTO: 6.93 10*3/MM3 (ref 3.4–10.8)

## 2021-09-24 PROCEDURE — 80061 LIPID PANEL: CPT

## 2021-09-24 PROCEDURE — 80053 COMPREHEN METABOLIC PANEL: CPT

## 2021-09-24 PROCEDURE — 84443 ASSAY THYROID STIM HORMONE: CPT

## 2021-09-24 PROCEDURE — 36415 COLL VENOUS BLD VENIPUNCTURE: CPT

## 2021-09-24 PROCEDURE — 85025 COMPLETE CBC W/AUTO DIFF WBC: CPT

## 2021-09-25 LAB
ALBUMIN SERPL-MCNC: 4.5 G/DL (ref 3.5–5.2)
ALBUMIN/GLOB SERPL: 1.7 G/DL
ALP SERPL-CCNC: 94 U/L (ref 39–117)
ALT SERPL W P-5'-P-CCNC: 92 U/L (ref 1–33)
ANION GAP SERPL CALCULATED.3IONS-SCNC: 11.3 MMOL/L (ref 5–15)
AST SERPL-CCNC: 40 U/L (ref 1–32)
BILIRUB SERPL-MCNC: 0.4 MG/DL (ref 0–1.2)
BUN SERPL-MCNC: 16 MG/DL (ref 6–20)
BUN/CREAT SERPL: 18.8 (ref 7–25)
CALCIUM SPEC-SCNC: 9.2 MG/DL (ref 8.6–10.5)
CHLORIDE SERPL-SCNC: 106 MMOL/L (ref 98–107)
CHOLEST SERPL-MCNC: 160 MG/DL (ref 0–200)
CO2 SERPL-SCNC: 23.7 MMOL/L (ref 22–29)
CREAT SERPL-MCNC: 0.85 MG/DL (ref 0.57–1)
GFR SERPL CREATININE-BSD FRML MDRD: 76 ML/MIN/1.73
GLOBULIN UR ELPH-MCNC: 2.7 GM/DL
GLUCOSE SERPL-MCNC: 79 MG/DL (ref 65–99)
HDLC SERPL-MCNC: 36 MG/DL (ref 40–60)
LDLC SERPL CALC-MCNC: 107 MG/DL (ref 0–100)
LDLC/HDLC SERPL: 2.94 {RATIO}
POTASSIUM SERPL-SCNC: 4.2 MMOL/L (ref 3.5–5.2)
PROT SERPL-MCNC: 7.2 G/DL (ref 6–8.5)
SODIUM SERPL-SCNC: 141 MMOL/L (ref 136–145)
TRIGL SERPL-MCNC: 91 MG/DL (ref 0–150)
TSH SERPL DL<=0.05 MIU/L-ACNC: 0.73 UIU/ML (ref 0.27–4.2)
VLDLC SERPL-MCNC: 17 MG/DL (ref 5–40)

## 2021-09-28 ENCOUNTER — OFFICE VISIT (OUTPATIENT)
Dept: FAMILY MEDICINE CLINIC | Age: 36
End: 2021-09-28

## 2021-09-28 VITALS
WEIGHT: 220 LBS | BODY MASS INDEX: 34.53 KG/M2 | HEART RATE: 77 BPM | TEMPERATURE: 98.2 F | DIASTOLIC BLOOD PRESSURE: 77 MMHG | SYSTOLIC BLOOD PRESSURE: 111 MMHG | HEIGHT: 67 IN

## 2021-09-28 DIAGNOSIS — Z00.00 ANNUAL PHYSICAL EXAM: Primary | ICD-10-CM

## 2021-09-28 PROBLEM — E04.1 THYROID NODULE: Status: ACTIVE | Noted: 2021-09-28

## 2021-09-28 PROBLEM — F33.42 RECURRENT MAJOR DEPRESSIVE DISORDER, IN FULL REMISSION: Status: ACTIVE | Noted: 2021-09-28

## 2021-09-28 PROCEDURE — 99395 PREV VISIT EST AGE 18-39: CPT | Performed by: FAMILY MEDICINE

## 2021-09-28 NOTE — ASSESSMENT & PLAN NOTE
She is UTD on pap smear, last done 4/2017 and this showed NIL with negative HR-HPV.  Five year repeat recommended.  She is UTD on COVID #1 (#2 pending), Tdap (9/2020), and flu (last year); will get at work.  This year's flu shot is due October.  She is UTD on routine labs.      Counseling on lifestyle interventions for weight loss.

## 2021-09-28 NOTE — PROGRESS NOTES
"Chief Complaint  Annual Exam (physical)    Subjective          Joelle Clement presents to [facility] today for her annual physical.  She is UTD on pap smear, last done 4/2017 and this showed NIL with negative HR-HPV.  Five year repeat recommended.  She is UTD on COVID #1 (#2 pending), Tdap (9/2020), and flu (last year).  This year's flu shot is due October.  She is UTD on routine labs.      Her transaminases were mildly elevated.  She has been trying to work on diet and exercise, but it is difficult.  She is a very picky eater and this doesn't give her many options when she is trying to eat a healthier diet.    Review of Systems   Constitutional: Negative for chills, fatigue and fever.   HENT: Negative for congestion, hearing loss and rhinorrhea.    Eyes: Negative for pain and visual disturbance.   Respiratory: Negative for cough and shortness of breath.    Cardiovascular: Negative for chest pain and palpitations.   Gastrointestinal: Negative for abdominal pain, constipation, diarrhea, nausea and vomiting.   Genitourinary: Negative for dysuria and hematuria.   Musculoskeletal: Negative for arthralgias and myalgias.   Skin: Negative for rash.   Neurological: Negative for weakness and numbness.   Psychiatric/Behavioral: Negative for dysphoric mood and sleep disturbance. The patient is not nervous/anxious.          Current Outpatient Medications:   •  levonorgestrel-ethinyl estradiol (SEASONALE) 0.15-0.03 MG per tablet, Take 1 tablet by mouth Daily., Disp: 182 tablet, Rfl: 0    Allergies:  Patient has no known allergies.      Objective   Vital Signs:   /77 (BP Location: Left arm, Patient Position: Sitting, Cuff Size: Large Adult)   Pulse 77   Temp 98.2 °F (36.8 °C) (Oral)   Ht 170.2 cm (67.01\")   Wt 99.8 kg (220 lb)   BMI 34.45 kg/m²     Physical Exam  Vitals reviewed.   Constitutional:       General: She is not in acute distress.     Appearance: Normal appearance. She " is well-developed.   HENT:      Head: Normocephalic and atraumatic.      Right Ear: External ear normal.      Left Ear: External ear normal.      Mouth/Throat:      Mouth: Mucous membranes are moist.   Eyes:      Extraocular Movements: Extraocular movements intact.      Conjunctiva/sclera: Conjunctivae normal.      Pupils: Pupils are equal, round, and reactive to light.   Cardiovascular:      Rate and Rhythm: Normal rate and regular rhythm.      Heart sounds: No murmur heard.     Pulmonary:      Effort: Pulmonary effort is normal.      Breath sounds: Normal breath sounds. No wheezing, rhonchi or rales.   Abdominal:      General: Bowel sounds are normal. There is no distension.      Palpations: Abdomen is soft.      Tenderness: There is no abdominal tenderness.   Musculoskeletal:         General: Normal range of motion.   Skin:     General: Skin is warm and dry.   Neurological:      Mental Status: She is alert and oriented to person, place, and time.      Deep Tendon Reflexes: Reflexes normal.   Psychiatric:         Mood and Affect: Mood and affect normal.         Behavior: Behavior normal.         Thought Content: Thought content normal.         Judgment: Judgment normal.             Assessment and Plan    Diagnoses and all orders for this visit:    1. Annual physical exam (Primary)  Assessment & Plan:  She is UTD on pap smear, last done 4/2017 and this showed NIL with negative HR-HPV.  Five year repeat recommended.  She is UTD on COVID #1 (#2 pending), Tdap (9/2020), and flu (last year); will get at work.  This year's flu shot is due October.  She is UTD on routine labs.          Follow Up   Return in about 1 year (around 9/28/2022) for Annual physical.  Patient was given instructions and counseling regarding her condition or for health maintenance advice. Please see specific information pulled into the AVS if appropriate.

## 2021-12-07 RX ORDER — LEVONORGESTREL AND ETHINYL ESTRADIOL 0.15-0.03
1 KIT ORAL DAILY
Qty: 91 TABLET | Refills: 3 | Status: SHIPPED | OUTPATIENT
Start: 2021-12-07 | End: 2022-11-29 | Stop reason: SDUPTHER

## 2021-12-29 ENCOUNTER — OFFICE VISIT (OUTPATIENT)
Dept: FAMILY MEDICINE CLINIC | Age: 36
End: 2021-12-29

## 2021-12-29 VITALS — SYSTOLIC BLOOD PRESSURE: 119 MMHG | DIASTOLIC BLOOD PRESSURE: 87 MMHG | HEART RATE: 100 BPM

## 2021-12-29 DIAGNOSIS — Z01.419 WELL WOMAN EXAM: Primary | ICD-10-CM

## 2021-12-29 LAB
CANDIDA SPECIES: NEGATIVE
GARDNERELLA VAGINALIS: NEGATIVE
T VAGINALIS DNA VAG QL PROBE+SIG AMP: NEGATIVE

## 2021-12-29 PROCEDURE — 87624 HPV HI-RISK TYP POOLED RSLT: CPT | Performed by: FAMILY MEDICINE

## 2021-12-29 PROCEDURE — G0123 SCREEN CERV/VAG THIN LAYER: HCPCS | Performed by: FAMILY MEDICINE

## 2021-12-29 PROCEDURE — 87510 GARDNER VAG DNA DIR PROBE: CPT | Performed by: FAMILY MEDICINE

## 2021-12-29 PROCEDURE — 87480 CANDIDA DNA DIR PROBE: CPT | Performed by: FAMILY MEDICINE

## 2021-12-29 PROCEDURE — 87660 TRICHOMONAS VAGIN DIR PROBE: CPT | Performed by: FAMILY MEDICINE

## 2021-12-29 PROCEDURE — 99395 PREV VISIT EST AGE 18-39: CPT | Performed by: FAMILY MEDICINE

## 2021-12-29 NOTE — PROGRESS NOTES
Chief Complaint  Gynecologic Exam    Subjective          Joelle Clement presents to [facility] today for her WWE.  She is due for pap smear, last done 4/2017 and this showed NIL with negative HR-HPV.  Five year repeat recommended.  She is UTD on COVID (Moderna x2, last shot 9/30/21), Tdap (9/2020), and flu (10/2021).  She is UTD on routine labs.      She has noticed an increased odor in early November.  Tried OTC boric acid suppositories which did seem to help but did not completely get rid of the issue.  She has not tried anything else so far.  No discharge, itching, irritation.    Review of Systems   Constitutional: Negative for chills, fatigue and fever.   HENT: Negative for congestion, hearing loss and rhinorrhea.    Eyes: Negative for pain and visual disturbance.   Respiratory: Negative for cough and shortness of breath.    Cardiovascular: Negative for chest pain and palpitations.   Gastrointestinal: Negative for abdominal pain, constipation, diarrhea, nausea and vomiting.   Genitourinary: Negative for dysuria, hematuria and vaginal discharge.        +Vaginal odor   Musculoskeletal: Positive for back pain. Negative for arthralgias and myalgias.   Skin: Negative for rash.   Neurological: Negative for weakness and numbness.   Psychiatric/Behavioral: Negative for dysphoric mood and sleep disturbance. The patient is not nervous/anxious.          Current Outpatient Medications:   •  levonorgestrel-ethinyl estradiol (SEASONALE) 0.15-0.03 MG per tablet, Take 1 tablet by mouth Daily., Disp: 91 tablet, Rfl: 3    Allergies:  Patient has no known allergies.      Objective   Vital Signs:   /87 (BP Location: Left arm, Patient Position: Sitting)   Pulse 100     Physical Exam  Constitutional:       Appearance: Normal appearance.   HENT:      Head: Normocephalic and atraumatic.   Eyes:      Extraocular Movements: Extraocular movements intact.      Conjunctiva/sclera: Conjunctivae  normal.   Pulmonary:      Effort: Pulmonary effort is normal. No respiratory distress.   Genitourinary:     General: Normal vulva.      Vagina: Normal.      Cervix: Normal.      Uterus: Normal.       Adnexa: Right adnexa normal and left adnexa normal.      Comments: Chaperone declined  Musculoskeletal:         General: Normal range of motion.   Skin:     General: Skin is warm and dry.   Neurological:      General: No focal deficit present.      Mental Status: She is alert and oriented to person, place, and time.   Psychiatric:         Mood and Affect: Mood normal.         Behavior: Behavior normal.         Thought Content: Thought content normal.         Judgment: Judgment normal.             Assessment and Plan    Diagnoses and all orders for this visit:    1. Well woman exam (Primary)  Assessment & Plan:  She is due for pap smear, last done 4/2017 and this showed NIL with negative HR-HPV.  Five year repeat recommended; pap smear done today along with vaginal screen.  She is UTD on COVID (Moderna x2, last shot 9/30/21), Tdap (9/2020), and flu (10/2021).  She is UTD on routine labs.      Orders:  -     IgP, Aptima HPV  -     Gardnerella vaginalis, Trichomonas vaginalis, Candida albicans, DNA - Swab, Vagina      Follow Up   Return in about 1 year (around 12/29/2022) for Annual physical.  Patient was given instructions and counseling regarding her condition or for health maintenance advice. Please see specific information pulled into the AVS if appropriate.

## 2021-12-29 NOTE — ASSESSMENT & PLAN NOTE
She is due for pap smear, last done 4/2017 and this showed NIL with negative HR-HPV.  Five year repeat recommended; pap smear done today along with vaginal screen.  She is UTD on COVID (Moderna x2, last shot 9/30/21), Tdap (9/2020), and flu (10/2021).  She is UTD on routine labs.

## 2022-01-04 LAB
CYTOLOGIST CVX/VAG CYTO: NORMAL
CYTOLOGY CVX/VAG DOC CYTO: NORMAL
CYTOLOGY CVX/VAG DOC THIN PREP: NORMAL
DX ICD CODE: NORMAL
HIV 1 & 2 AB SER-IMP: NORMAL
HPV I/H RISK 4 DNA CVX QL PROBE+SIG AMP: NEGATIVE
Lab: NORMAL
OTHER STN SPEC: NORMAL
STAT OF ADQ CVX/VAG CYTO-IMP: NORMAL

## 2022-03-01 ENCOUNTER — OFFICE VISIT (OUTPATIENT)
Dept: FAMILY MEDICINE CLINIC | Age: 37
End: 2022-03-01

## 2022-03-01 VITALS
DIASTOLIC BLOOD PRESSURE: 78 MMHG | TEMPERATURE: 98 F | HEIGHT: 67 IN | BODY MASS INDEX: 32.93 KG/M2 | SYSTOLIC BLOOD PRESSURE: 116 MMHG | WEIGHT: 209.8 LBS | HEART RATE: 83 BPM

## 2022-03-01 DIAGNOSIS — R10.13 EPIGASTRIC PAIN: Primary | ICD-10-CM

## 2022-03-01 DIAGNOSIS — K21.9 GASTROESOPHAGEAL REFLUX DISEASE WITHOUT ESOPHAGITIS: ICD-10-CM

## 2022-03-01 PROBLEM — Z01.419 WELL WOMAN EXAM: Status: RESOLVED | Noted: 2021-09-28 | Resolved: 2022-03-01

## 2022-03-01 PROCEDURE — 99214 OFFICE O/P EST MOD 30 MIN: CPT | Performed by: FAMILY MEDICINE

## 2022-03-01 NOTE — ASSESSMENT & PLAN NOTE
Joelle had a bad spell of epigastric pain associate with reflux type symptoms about 4 days ago.  It has been slowly improving over that time, but she has been nervous about eating ever since and has lost 10 pounds over the weekend.  Based on her symptoms, it sounds like a flare of reflux, although pancreas, liver or gallbladder could also be at play.  I am going to send her down for some blood work to evaluate as below.  No H. pylori testing ordered as she has been taking the PPI off and on over the past few days.  We did discuss that H. pylori was a possible contributing factor  And may send her for a breath test in the future after she has been off the PPI couple weeks if needed.  For now, since her pain is almost completely resolved and she is no longer tender, I would like for her to do a 14-day course of the Prilosec 40 mg twice daily.  She may use the sucralfate as needed but does not think she requires that currently.  If symptoms worsen or fail to improve, we could always get her in for right upper quadrant ultrasound or EGD as indicated.  For now, she will keep me posted on how she does going forward.  Continue to advance diet as tolerated.

## 2022-03-01 NOTE — PROGRESS NOTES
"Chief Complaint  Heartburn    Subjective          Joelle Clement presents to [facility] today for acute complaint of severe heartburn.      Four days ago, she experienced an acute onset of severe abdominal pain (epigastric).  She also feels the pain radiate through to her back (typical for her reflux).  +Chills, bloating, nausea (but no vomiting).  Sx initially started after she had a few sips of Mountain Dew, then worsened after she ate the potluck.  Pain progressively worsened over the next few days.  She tried using some Prilosec 40 mg BID over the weekend.  She tried Tums as well.  She tried sucralfate twice daily.  As the weekend went on, the pain started to subside but appetite was still poor.  She has lost 10 lbs.  She has been trying to eat really bland foods when she does eat.  Still having a lot of belching.  She has never had an EGD before.  No hematochezia or melena.      Current Outpatient Medications:   •  levonorgestrel-ethinyl estradiol (SEASONALE) 0.15-0.03 MG per tablet, Take 1 tablet by mouth Daily., Disp: 91 tablet, Rfl: 3    Allergies:  Patient has no known allergies.      Objective   Vital Signs:   Vitals:    03/01/22 1120   BP: 116/78   BP Location: Left arm   Patient Position: Sitting   Pulse: 83   Temp: 98 °F (36.7 °C)   TempSrc: Oral   Weight: 95.2 kg (209 lb 12.8 oz)   Height: 170.2 cm (67.01\")       Physical Exam  Vitals reviewed.   Constitutional:       General: She is not in acute distress.     Appearance: Normal appearance. She is well-developed.   HENT:      Head: Normocephalic and atraumatic.      Right Ear: External ear normal.      Left Ear: External ear normal.   Eyes:      Extraocular Movements: Extraocular movements intact.      Conjunctiva/sclera: Conjunctivae normal.      Pupils: Pupils are equal, round, and reactive to light.   Cardiovascular:      Rate and Rhythm: Normal rate and regular rhythm.      Heart sounds: No murmur " heard.      Pulmonary:      Effort: Pulmonary effort is normal.      Breath sounds: Normal breath sounds. No wheezing, rhonchi or rales.   Abdominal:      General: Bowel sounds are normal. There is no distension.      Palpations: Abdomen is soft.      Tenderness: There is no abdominal tenderness (including epigastrium). There is no guarding or rebound.   Musculoskeletal:         General: Normal range of motion.   Neurological:      Mental Status: She is alert.   Psychiatric:         Mood and Affect: Affect normal.             Assessment and Plan    Diagnoses and all orders for this visit:    1. Epigastric pain (Primary)  Assessment & Plan:  Joelle had a bad spell of epigastric pain associate with reflux type symptoms about 4 days ago.  It has been slowly improving over that time, but she has been nervous about eating ever since and has lost 10 pounds over the weekend.  Based on her symptoms, it sounds like a flare of reflux, although pancreas, liver or gallbladder could also be at play.  I am going to send her down for some blood work to evaluate as below.  No H. pylori testing ordered as she has been taking the PPI off and on over the past few days.  We did discuss that H. pylori was a possible contributing factor  And may send her for a breath test in the future after she has been off the PPI couple weeks if needed.  For now, since her pain is almost completely resolved and she is no longer tender, I would like for her to do a 14-day course of the Prilosec 40 mg twice daily.  She may use the sucralfate as needed but does not think she requires that currently.  If symptoms worsen or fail to improve, we could always get her in for right upper quadrant ultrasound or EGD as indicated.  For now, she will keep me posted on how she does going forward.  Continue to advance diet as tolerated.    Orders:  -     Comprehensive Metabolic Panel; Future  -     Lipase; Future  -     CBC & Differential; Future    2. Gastroesophageal  reflux disease without esophagitis      Follow Up   Return if symptoms worsen or fail to improve.  Patient was given instructions and counseling regarding her condition or for health maintenance advice. Please see specific information pulled into the AVS if appropriate.

## 2022-03-02 ENCOUNTER — LAB (OUTPATIENT)
Dept: LAB | Facility: HOSPITAL | Age: 37
End: 2022-03-02

## 2022-03-02 DIAGNOSIS — B17.9 HEPATITIS, ACUTE: ICD-10-CM

## 2022-03-02 DIAGNOSIS — R10.13 EPIGASTRIC PAIN: ICD-10-CM

## 2022-03-02 LAB
ALBUMIN SERPL-MCNC: 4 G/DL (ref 3.5–5.2)
ALBUMIN/GLOB SERPL: 1.5 G/DL
ALP SERPL-CCNC: 83 U/L (ref 39–117)
ALT SERPL W P-5'-P-CCNC: 482 U/L (ref 1–33)
ANION GAP SERPL CALCULATED.3IONS-SCNC: 12.9 MMOL/L (ref 5–15)
AST SERPL-CCNC: 213 U/L (ref 1–32)
BASOPHILS # BLD AUTO: 0.06 10*3/MM3 (ref 0–0.2)
BASOPHILS NFR BLD AUTO: 1.1 % (ref 0–1.5)
BILIRUB SERPL-MCNC: 0.5 MG/DL (ref 0–1.2)
BUN SERPL-MCNC: 14 MG/DL (ref 6–20)
BUN/CREAT SERPL: 17.3 (ref 7–25)
CALCIUM SPEC-SCNC: 9.5 MG/DL (ref 8.6–10.5)
CHLORIDE SERPL-SCNC: 103 MMOL/L (ref 98–107)
CO2 SERPL-SCNC: 23.1 MMOL/L (ref 22–29)
CREAT SERPL-MCNC: 0.81 MG/DL (ref 0.57–1)
DEPRECATED RDW RBC AUTO: 40.4 FL (ref 37–54)
EGFRCR SERPLBLD CKD-EPI 2021: 96.6 ML/MIN/1.73
EOSINOPHIL # BLD AUTO: 0.19 10*3/MM3 (ref 0–0.4)
EOSINOPHIL NFR BLD AUTO: 3.4 % (ref 0.3–6.2)
ERYTHROCYTE [DISTWIDTH] IN BLOOD BY AUTOMATED COUNT: 12.7 % (ref 12.3–15.4)
GLOBULIN UR ELPH-MCNC: 2.7 GM/DL
GLUCOSE SERPL-MCNC: 80 MG/DL (ref 65–99)
HCT VFR BLD AUTO: 44.6 % (ref 34–46.6)
HGB BLD-MCNC: 14.5 G/DL (ref 12–15.9)
IMM GRANULOCYTES # BLD AUTO: 0 10*3/MM3 (ref 0–0.05)
IMM GRANULOCYTES NFR BLD AUTO: 0 % (ref 0–0.5)
LIPASE SERPL-CCNC: 38 U/L (ref 13–60)
LYMPHOCYTES # BLD AUTO: 2.14 10*3/MM3 (ref 0.7–3.1)
LYMPHOCYTES NFR BLD AUTO: 38.1 % (ref 19.6–45.3)
MCH RBC QN AUTO: 28 PG (ref 26.6–33)
MCHC RBC AUTO-ENTMCNC: 32.5 G/DL (ref 31.5–35.7)
MCV RBC AUTO: 86.1 FL (ref 79–97)
MONOCYTES # BLD AUTO: 0.45 10*3/MM3 (ref 0.1–0.9)
MONOCYTES NFR BLD AUTO: 8 % (ref 5–12)
NEUTROPHILS NFR BLD AUTO: 2.77 10*3/MM3 (ref 1.7–7)
NEUTROPHILS NFR BLD AUTO: 49.4 % (ref 42.7–76)
PLATELET # BLD AUTO: 398 10*3/MM3 (ref 140–450)
PMV BLD AUTO: 9.2 FL (ref 6–12)
POTASSIUM SERPL-SCNC: 4 MMOL/L (ref 3.5–5.2)
PROT SERPL-MCNC: 6.7 G/DL (ref 6–8.5)
RBC # BLD AUTO: 5.18 10*6/MM3 (ref 3.77–5.28)
SODIUM SERPL-SCNC: 139 MMOL/L (ref 136–145)
WBC NRBC COR # BLD: 5.61 10*3/MM3 (ref 3.4–10.8)

## 2022-03-02 PROCEDURE — 81332 SERPINA1 GENE: CPT

## 2022-03-02 PROCEDURE — 82728 ASSAY OF FERRITIN: CPT

## 2022-03-02 PROCEDURE — 83540 ASSAY OF IRON: CPT

## 2022-03-02 PROCEDURE — 84439 ASSAY OF FREE THYROXINE: CPT

## 2022-03-02 PROCEDURE — 83690 ASSAY OF LIPASE: CPT

## 2022-03-02 PROCEDURE — 82550 ASSAY OF CK (CPK): CPT

## 2022-03-02 PROCEDURE — 80050 GENERAL HEALTH PANEL: CPT

## 2022-03-02 PROCEDURE — 84466 ASSAY OF TRANSFERRIN: CPT

## 2022-03-02 PROCEDURE — 36415 COLL VENOUS BLD VENIPUNCTURE: CPT

## 2022-03-02 PROCEDURE — 80143 DRUG ASSAY ACETAMINOPHEN: CPT

## 2022-03-03 ENCOUNTER — LAB (OUTPATIENT)
Dept: LAB | Facility: HOSPITAL | Age: 37
End: 2022-03-03

## 2022-03-03 ENCOUNTER — HOSPITAL ENCOUNTER (OUTPATIENT)
Dept: ULTRASOUND IMAGING | Facility: HOSPITAL | Age: 37
Discharge: HOME | End: 2022-03-03

## 2022-03-03 DIAGNOSIS — B17.9 HEPATITIS, ACUTE: Primary | ICD-10-CM

## 2022-03-03 DIAGNOSIS — R79.89 ELEVATED LFTS: ICD-10-CM

## 2022-03-03 DIAGNOSIS — B17.9 HEPATITIS, ACUTE: ICD-10-CM

## 2022-03-03 LAB
ALBUMIN SERPL-MCNC: 4.5 G/DL (ref 3.5–5.2)
ALP SERPL-CCNC: 90 U/L (ref 39–117)
ALT SERPL W P-5'-P-CCNC: 561 U/L (ref 1–33)
APAP SERPL-MCNC: <5 MCG/ML (ref 0–30)
AST SERPL-CCNC: 249 U/L (ref 1–32)
BILIRUB CONJ SERPL-MCNC: 0.3 MG/DL (ref 0–0.3)
BILIRUB INDIRECT SERPL-MCNC: 0.2 MG/DL
BILIRUB SERPL-MCNC: 0.5 MG/DL (ref 0–1.2)
CK SERPL-CCNC: 96 U/L (ref 20–180)
FERRITIN SERPL-MCNC: 97.6 NG/ML (ref 13–150)
IRON 24H UR-MRATE: 87 MCG/DL (ref 37–145)
IRON SATN MFR SERPL: 17 % (ref 20–50)
PROT SERPL-MCNC: 7.1 G/DL (ref 6–8.5)
T4 FREE SERPL-MCNC: 1.22 NG/DL (ref 0.93–1.7)
TIBC SERPL-MCNC: 507 MCG/DL (ref 298–536)
TRANSFERRIN SERPL-MCNC: 340 MG/DL (ref 200–360)
TSH SERPL DL<=0.05 MIU/L-ACNC: 1.43 UIU/ML (ref 0.27–4.2)

## 2022-03-03 PROCEDURE — 80074 ACUTE HEPATITIS PANEL: CPT

## 2022-03-03 PROCEDURE — 82525 ASSAY OF COPPER: CPT

## 2022-03-03 PROCEDURE — 76705 ECHO EXAM OF ABDOMEN: CPT

## 2022-03-03 PROCEDURE — 80076 HEPATIC FUNCTION PANEL: CPT

## 2022-03-03 PROCEDURE — 36415 COLL VENOUS BLD VENIPUNCTURE: CPT

## 2022-03-04 LAB
HAV IGM SERPL QL IA: NORMAL
HBV CORE IGM SERPL QL IA: NORMAL
HBV SURFACE AG SERPL QL IA: NORMAL
HCV AB SER DONR QL: NORMAL

## 2022-03-07 ENCOUNTER — OFFICE VISIT (OUTPATIENT)
Dept: GASTROENTEROLOGY | Facility: CLINIC | Age: 37
End: 2022-03-07

## 2022-03-07 VITALS
DIASTOLIC BLOOD PRESSURE: 80 MMHG | HEART RATE: 80 BPM | OXYGEN SATURATION: 97 % | SYSTOLIC BLOOD PRESSURE: 130 MMHG | BODY MASS INDEX: 32.55 KG/M2 | WEIGHT: 207.4 LBS | HEIGHT: 67 IN

## 2022-03-07 DIAGNOSIS — R74.8 ELEVATED LIVER ENZYMES: ICD-10-CM

## 2022-03-07 DIAGNOSIS — R10.11 RUQ PAIN: Primary | ICD-10-CM

## 2022-03-07 DIAGNOSIS — K80.20 GALL STONES: ICD-10-CM

## 2022-03-07 DIAGNOSIS — R14.0 BLOATING: ICD-10-CM

## 2022-03-07 DIAGNOSIS — K76.0 HEPATIC STEATOSIS: ICD-10-CM

## 2022-03-07 LAB — COPPER SERPL-MCNC: 188 UG/DL (ref 80–158)

## 2022-03-07 PROCEDURE — 99214 OFFICE O/P EST MOD 30 MIN: CPT

## 2022-03-07 NOTE — PROGRESS NOTES
Chief Complaint  Elevated Hepatic Enzymes    Joelle Clement is a 36 y.o. female who presents to [facility] on referral from Jessica Abarca MD for a gastroenterology evaluation of elevated liver enzymes.        History of Present Illness  New patient presents to the office for pain and pressure in her RUQ and epigastric area. She feels like her gallbladder is causing her most of her problems. She had an attack of epigastric pain that radiated to her back a few weeks ago. Pain today is dull and located in RUQ. She has pain when eating and she is nauseated most of the time. She has decreased appetite. She has a lot of bloating, belching, and flatulence. She had heartburn with certain trigger foods, in the past month she has had heartburn more frequently. Omeprazole has been adequately treating heartburn. She takes Ibuprofen about 2 times a week. She has always struggle with constipation, but recently has been having normal bowel movements daily. Denies diarrhea, melena, and hematochezia.  She denies any abdominal tenderness.  She has lost 13 pounds since 2/18/2021. She denies alcohol and IV drug use. She denies family history of liver disease.      US abdomen 3/03/2022 mild hepatomegaly with subtle hyperechogenicity, contracted gallbladder with gallstones, no biliary dilation, no ascites    Past Medical History:   Diagnosis Date   • Fatty liver 3/3/22    Per ultrasound   • GERD (gastroesophageal reflux disease)     Occasional reflux for about a year        Past Surgical History:   Procedure Laterality Date   • US GUIDED FINE NEEDLE ASPIRATION  1/12/2021         Current Outpatient Medications:   •  levonorgestrel-ethinyl estradiol (SEASONALE) 0.15-0.03 MG per tablet, Take 1 tablet by mouth Daily., Disp: 91 tablet, Rfl: 3     No Known Allergies    Family History   Problem Relation Age of Onset   • Colon cancer Maternal Uncle         Social History     Social History  "Narrative   • Not on file       Immunization:  Immunization History   Administered Date(s) Administered   • COVID-19 (MODERNA) 1st, 2nd, 3rd Dose Only 08/31/2021, 09/30/2021   • DTP 1985, 01/13/1986, 03/25/1986, 04/14/1987, 04/02/1990   • Flu Vaccine Quad PF >36MO 10/01/2021   • FluLaval/Fluarix/Fluzone >6 10/01/2021   • Hep B, Adolescent or Pediatric 07/21/1997, 08/21/1997, 01/19/1998   • Hepatitis A 06/11/2018   • MMR 01/27/1987, 04/23/1992   • OPV 1985, 01/13/1986, 03/24/1986, 04/14/1987, 04/02/1990   • Td 11/20/2002, 11/27/2002   • Tdap 09/21/2020        Objective     Vital Signs:   /80 (BP Location: Left arm, Patient Position: Sitting, Cuff Size: Adult)   Pulse 80   Ht 170.2 cm (67.01\")   Wt 94.1 kg (207 lb 6.4 oz)   SpO2 97%   BMI 32.48 kg/m²       Physical Exam  Constitutional:       Appearance: Normal appearance. She is normal weight.   HENT:      Head: Normocephalic.   Cardiovascular:      Rate and Rhythm: Normal rate and regular rhythm.      Heart sounds: Normal heart sounds.   Pulmonary:      Effort: Pulmonary effort is normal.      Breath sounds: Normal breath sounds.   Abdominal:      General: Abdomen is flat. Bowel sounds are normal.      Palpations: Abdomen is soft.   Skin:     General: Skin is warm and dry.   Neurological:      Mental Status: She is alert and oriented to person, place, and time. Mental status is at baseline.   Psychiatric:         Mood and Affect: Mood normal.         Behavior: Behavior normal.         Thought Content: Thought content normal.         Judgment: Judgment normal.         Result Review :     CBC w/diff    CBC w/Diff 9/24/21 3/2/22   WBC 6.93 5.61   RBC 5.24 5.18   Hemoglobin 14.8 14.5   Hematocrit 43.7 44.6   MCV 83.4 86.1   MCH 28.2 28.0   MCHC 33.9 32.5   RDW 13.1 12.7   Platelets 389 398   Neutrophil Rel % 58.5 49.4   Immature Granulocyte Rel % 0.1 0.0   Lymphocyte Rel % 25.4 38.1   Monocyte Rel % 5.6 8.0   Eosinophil Rel % 9.4 (A) 3.4 "   Basophil Rel % 1.0 1.1   (A) Abnormal value            CMP    CMP 9/24/21 3/2/22 3/3/22   Glucose 79 80    BUN 16 14    Creatinine 0.85 0.81    eGFR Non African Am 76     Sodium 141 139    Potassium 4.2 4.0    Chloride 106 103    Calcium 9.2 9.5    Albumin 4.50 4.00 4.50   Total Bilirubin 0.4 0.5 0.5   Alkaline Phosphatase 94 83 90   AST (SGOT) 40 (A) 213 (A) 249 (A)   ALT (SGPT) 92 (A) 482 (A) 561 (A)   (A) Abnormal value              Acute HEP Panel   Hepatitis B Surface Ag   Date Value Ref Range Status   03/03/2022 Non-Reactive Non-Reactive Final     Hep A IgM   Date Value Ref Range Status   03/03/2022 Non-Reactive Non-Reactive Final     Hep B C IgM   Date Value Ref Range Status   03/03/2022 Non-Reactive Non-Reactive Final     Hepatitis C Ab   Date Value Ref Range Status   03/03/2022 Non-Reactive Non-Reactive Final     Alpha 1 No results found for: AFPTM - pending results  Serum Copper - pending results             Assessment and Plan    Diagnoses and all orders for this visit:    1. RUQ pain (Primary)  -     Ambulatory Referral to General Surgery    2. Gall stones  -     Ambulatory Referral to General Surgery    3. Bloating    4. Elevated liver enzymes  -     Comprehensive Metabolic Panel; Future    5. Hepatic steatosis    Suggested the patient be scheduled for an EGD.  Patient refused at this time wants to proceed with consultation with general surgery regarding her gallbladder.  Discussed how H. pylori, gastric ulcers, and gastritis cannot be ruled out in the differential.  She still wishes to proceed with General Surgery consultation, order placed.  She will consider an EGD depending on their recommendation.  Told the patient to continue taking Omeprazole daily.  Discouraged use of NSAIDs.  Discussed hepatosteatosis finding on liver ultrasound and elevated liver enzymes.  Recommended liver work-up.  Patient does not wish to proceed with liver work-up until after consultation with General Surgery.  Repeat  CMP ordered in 1 week to assess liver enzymes.  Encourage patient to call the office after seeing General Surgery if she wishes to proceed with an EGD.  Encourage patient to go to the Emergency Department if she has any more acute pain attacks or any worsening symptoms. Follow-up appointment made in 3 months to discuss necessary liver work-up.         Follow Up   Return in about 3 months (around 6/7/2022).  Patient was given instructions and counseling regarding her condition or for health maintenance advice. Please see specific information pulled into the AVS if appropriate.

## 2022-03-09 ENCOUNTER — LAB (OUTPATIENT)
Dept: LAB | Facility: HOSPITAL | Age: 37
End: 2022-03-09

## 2022-03-09 ENCOUNTER — TELEPHONE (OUTPATIENT)
Dept: GASTROENTEROLOGY | Facility: CLINIC | Age: 37
End: 2022-03-09

## 2022-03-09 DIAGNOSIS — R74.8 ELEVATED LIVER ENZYMES: ICD-10-CM

## 2022-03-09 DIAGNOSIS — R79.0 ABNORMAL BLOOD LEVEL OF COPPER: ICD-10-CM

## 2022-03-09 DIAGNOSIS — R74.8 ELEVATED LIVER ENZYMES: Primary | ICD-10-CM

## 2022-03-09 DIAGNOSIS — K76.0 HEPATIC STEATOSIS: ICD-10-CM

## 2022-03-09 LAB
ALBUMIN SERPL-MCNC: 4.8 G/DL (ref 3.5–5.2)
ALBUMIN/GLOB SERPL: 1.6 G/DL
ALP SERPL-CCNC: 104 U/L (ref 39–117)
ALPHA-FETOPROTEIN: 4.23 NG/ML (ref 0–8.3)
ALT SERPL W P-5'-P-CCNC: 1000 U/L (ref 1–33)
ANION GAP SERPL CALCULATED.3IONS-SCNC: 13.1 MMOL/L (ref 5–15)
AST SERPL-CCNC: 468 U/L (ref 1–32)
BILIRUB SERPL-MCNC: 0.7 MG/DL (ref 0–1.2)
BUN SERPL-MCNC: 13 MG/DL (ref 6–20)
BUN/CREAT SERPL: 15.5 (ref 7–25)
CALCIUM SPEC-SCNC: 10 MG/DL (ref 8.6–10.5)
CERULOPLASMIN SERPL-MCNC: 42 MG/DL (ref 19–39)
CHLORIDE SERPL-SCNC: 100 MMOL/L (ref 98–107)
CO2 SERPL-SCNC: 25.9 MMOL/L (ref 22–29)
CREAT SERPL-MCNC: 0.84 MG/DL (ref 0.57–1)
EGFRCR SERPLBLD CKD-EPI 2021: 92.5 ML/MIN/1.73
GLOBULIN UR ELPH-MCNC: 3 GM/DL
GLUCOSE SERPL-MCNC: 126 MG/DL (ref 65–99)
INR PPP: 1.11 (ref 2–3)
POTASSIUM SERPL-SCNC: 3.8 MMOL/L (ref 3.5–5.2)
PROT SERPL-MCNC: 7.8 G/DL (ref 6–8.5)
PROTHROMBIN TIME: 11.5 SECONDS (ref 9.4–12)
SODIUM SERPL-SCNC: 139 MMOL/L (ref 136–145)

## 2022-03-09 PROCEDURE — 86015 ACTIN ANTIBODY EACH: CPT

## 2022-03-09 PROCEDURE — 85610 PROTHROMBIN TIME: CPT

## 2022-03-09 PROCEDURE — 36415 COLL VENOUS BLD VENIPUNCTURE: CPT

## 2022-03-09 PROCEDURE — 82390 ASSAY OF CERULOPLASMIN: CPT

## 2022-03-09 PROCEDURE — 86225 DNA ANTIBODY NATIVE: CPT

## 2022-03-09 PROCEDURE — 82525 ASSAY OF COPPER: CPT

## 2022-03-09 PROCEDURE — 82105 ALPHA-FETOPROTEIN SERUM: CPT

## 2022-03-09 PROCEDURE — 80053 COMPREHEN METABOLIC PANEL: CPT

## 2022-03-09 PROCEDURE — 86038 ANTINUCLEAR ANTIBODIES: CPT

## 2022-03-09 PROCEDURE — 86381 MITOCHONDRIAL ANTIBODY EACH: CPT

## 2022-03-09 NOTE — TELEPHONE ENCOUNTER
Called pt. About lab results. Pt has already spoken with her PCP, and Pt. Has already completed the ordered labs. ----- Message from LANDEN Méndez sent at 3/9/2022  9:43 AM EST -----  Her serum copper is elevated. This warrants a more comprehensive liver work-up. I will place the orders for her to complete these labs at her earliest convenience.

## 2022-03-10 ENCOUNTER — ANCILLARY ORDERS (OUTPATIENT)
Dept: GASTROENTEROLOGY | Facility: CLINIC | Age: 37
End: 2022-03-10

## 2022-03-10 ENCOUNTER — TELEPHONE (OUTPATIENT)
Dept: GASTROENTEROLOGY | Facility: CLINIC | Age: 37
End: 2022-03-10

## 2022-03-10 ENCOUNTER — HOSPITAL ENCOUNTER (OUTPATIENT)
Dept: MRI IMAGING | Facility: HOSPITAL | Age: 37
Discharge: HOME | End: 2022-03-10

## 2022-03-10 DIAGNOSIS — R10.11 RUQ PAIN: ICD-10-CM

## 2022-03-10 DIAGNOSIS — R74.8 ELEVATED LIVER ENZYMES: Primary | ICD-10-CM

## 2022-03-10 DIAGNOSIS — K80.20 GALL STONES: ICD-10-CM

## 2022-03-10 DIAGNOSIS — R74.8 ELEVATED LIVER ENZYMES: ICD-10-CM

## 2022-03-10 LAB
DSDNA IGG SERPL IA-ACNC: NEGATIVE [IU]/ML
NUCLEAR IGG SER IA-RTO: NEGATIVE

## 2022-03-10 PROCEDURE — 0 GADOBENATE DIMEGLUMINE 529 MG/ML SOLUTION

## 2022-03-10 PROCEDURE — A9577 INJ MULTIHANCE: HCPCS

## 2022-03-10 PROCEDURE — 74183 MRI ABD W/O CNTR FLWD CNTR: CPT

## 2022-03-10 RX ADMIN — GADOBENATE DIMEGLUMINE 20 ML: 529 INJECTION, SOLUTION INTRAVENOUS at 17:04

## 2022-03-10 NOTE — TELEPHONE ENCOUNTER
Pt had been called. Pt is aware of results and has agreed to get MRI MRCP done stat. ----- Message from LANDEN Méndez sent at 3/10/2022 10:03 AM EST -----  Patient needs STAT MRI MRCP today to rule out common bile duct stone due to her rising liver enzymes. Order placed. Please call the patient.

## 2022-03-11 ENCOUNTER — OFFICE VISIT (OUTPATIENT)
Dept: SURGERY | Facility: CLINIC | Age: 37
End: 2022-03-11

## 2022-03-11 ENCOUNTER — PREP FOR SURGERY (OUTPATIENT)
Dept: OTHER | Facility: HOSPITAL | Age: 37
End: 2022-03-11

## 2022-03-11 ENCOUNTER — TELEPHONE (OUTPATIENT)
Dept: GASTROENTEROLOGY | Facility: CLINIC | Age: 37
End: 2022-03-11

## 2022-03-11 ENCOUNTER — LAB (OUTPATIENT)
Dept: LAB | Facility: HOSPITAL | Age: 37
End: 2022-03-11

## 2022-03-11 VITALS — HEIGHT: 67 IN | WEIGHT: 209.44 LBS | RESPIRATION RATE: 18 BRPM | BODY MASS INDEX: 32.87 KG/M2

## 2022-03-11 DIAGNOSIS — K80.20 GALLSTONES: Primary | ICD-10-CM

## 2022-03-11 DIAGNOSIS — K80.20 GALL STONES: ICD-10-CM

## 2022-03-11 DIAGNOSIS — R74.8 ELEVATED LIVER ENZYMES: ICD-10-CM

## 2022-03-11 DIAGNOSIS — R79.89 ELEVATED LIVER FUNCTION TESTS: ICD-10-CM

## 2022-03-11 DIAGNOSIS — R10.11 RUQ PAIN: ICD-10-CM

## 2022-03-11 LAB
ALBUMIN SERPL-MCNC: 4.7 G/DL (ref 3.5–5.2)
ALP SERPL-CCNC: 100 U/L (ref 39–117)
ALT SERPL W P-5'-P-CCNC: 1131 U/L (ref 1–33)
AST SERPL-CCNC: 517 U/L (ref 1–32)
BILIRUB CONJ SERPL-MCNC: 0.4 MG/DL (ref 0–0.3)
BILIRUB INDIRECT SERPL-MCNC: 0.6 MG/DL
BILIRUB SERPL-MCNC: 1 MG/DL (ref 0–1.2)
INR PPP: 1.12 (ref 2–3)
LAB DIRECTOR NAME PROVIDER: NORMAL
PROT SERPL-MCNC: 7.3 G/DL (ref 6–8.5)
PROTHROMBIN TIME: 11.5 SECONDS (ref 9.4–12)
SERPINA1 GENE MUT ANL BLD/T: NORMAL
SERPINA1 GENE MUT TESTED BLD/T: NORMAL

## 2022-03-11 PROCEDURE — 99204 OFFICE O/P NEW MOD 45 MIN: CPT | Performed by: SURGERY

## 2022-03-11 PROCEDURE — 80076 HEPATIC FUNCTION PANEL: CPT

## 2022-03-11 PROCEDURE — 36415 COLL VENOUS BLD VENIPUNCTURE: CPT

## 2022-03-11 PROCEDURE — 85610 PROTHROMBIN TIME: CPT

## 2022-03-11 RX ORDER — SODIUM CHLORIDE 0.9 % (FLUSH) 0.9 %
10 SYRINGE (ML) INJECTION EVERY 12 HOURS SCHEDULED
Status: CANCELLED | OUTPATIENT
Start: 2022-03-11

## 2022-03-11 RX ORDER — OMEPRAZOLE 20 MG/1
20 CAPSULE, DELAYED RELEASE ORAL DAILY PRN
COMMUNITY
End: 2023-01-18

## 2022-03-11 RX ORDER — ONDANSETRON 2 MG/ML
4 INJECTION INTRAMUSCULAR; INTRAVENOUS EVERY 6 HOURS PRN
Status: CANCELLED | OUTPATIENT
Start: 2022-03-11

## 2022-03-11 RX ORDER — SODIUM CHLORIDE, SODIUM LACTATE, POTASSIUM CHLORIDE, CALCIUM CHLORIDE 600; 310; 30; 20 MG/100ML; MG/100ML; MG/100ML; MG/100ML
70 INJECTION, SOLUTION INTRAVENOUS CONTINUOUS
Status: CANCELLED | OUTPATIENT
Start: 2022-03-11

## 2022-03-11 RX ORDER — INDOCYANINE GREEN AND WATER 25 MG
1.25 KIT INJECTION ONCE
Status: CANCELLED | OUTPATIENT
Start: 2022-03-11 | End: 2022-03-11

## 2022-03-11 RX ORDER — SODIUM CHLORIDE 0.9 % (FLUSH) 0.9 %
10 SYRINGE (ML) INJECTION AS NEEDED
Status: CANCELLED | OUTPATIENT
Start: 2022-03-11

## 2022-03-11 NOTE — H&P (VIEW-ONLY)
"Inpatient History and Physical Surgical Orders    Preadmission Location:   Preadmission Time:  Facility:  Surgery Date:  Surgery Time:  Preadmission Test date:     Chief Complaint  Outpatient History and Physical / Surgical Orders    Primary Care Provider: Jessica Abarca MD    Referring Provider: Lourdes Marcum APRN    Subjective      Patient Name: Joelle Clement : 1985    HPI  The patient is a 36-year-old female who has been having pretty classic biliary colic pain with pain in the right upper quadrant radiating back to the right flank.  She has had an ultrasound and an MRI that have revealed gallstones but no evidence of any type of common bile duct stones.  She has had elevated transaminases as well with an ALT of over thousand and an AST of about 500.  Her bilirubin and alkaline phosphatase levels are normal and her MRI did not show any specific abnormalities of the liver.  She has had an acute hepatitis panel that has been negative.    Past History:  Medical History: has a past medical history of Cholelithiasis, Fatty liver (3/3/22), and GERD (gastroesophageal reflux disease).   Surgical History: has a past surgical history that includes US Guided Fine Needle Aspiration (2021).   Family History: family history includes Colon cancer in her maternal uncle.   Social History: reports that she has never smoked. She has never used smokeless tobacco. She reports that she does not drink alcohol and does not use drugs.  Allergies: Patient has no known allergies.       Current Outpatient Medications:   •  levonorgestrel-ethinyl estradiol (SEASONALE) 0.15-0.03 MG per tablet, Take 1 tablet by mouth Daily., Disp: 91 tablet, Rfl: 3  No current facility-administered medications for this visit.       Objective   Vital Signs:   Resp 18   Ht 170.2 cm (67.01\")   Wt 95 kg (209 lb 7 oz)   BMI 32.79 kg/m²       Physical Exam  Vitals and nursing note reviewed.   Constitutional:       Appearance: " Normal appearance. The patient is well-developed.   Cardiovascular:      Rate and Rhythm: Normal rate and regular rhythm.   Pulmonary:      Effort: Pulmonary effort is normal.      Breath sounds: Normal air entry.   Abdominal:      General: Bowel sounds are normal.      Palpations: Abdomen is soft.      Skin:     General: Skin is warm and dry.   Neurological:      Mental Status: The patient is alert and oriented to person, place, and time.      Motor: Motor function is intact.   Psychiatric:         Mood and Affect: Mood normal.       Result Review :               Assessment and Plan   Diagnoses and all orders for this visit:    1. Gallstones (Primary)    2. Elevated liver function tests    We will go ahead and perform a laparoscopic cholecystectomy as she seems to be pretty symptomatic from these gallstones.  I will go ahead and do a liver biopsy while over there due to the elevated transaminases.  I have described those procedures to her as well as the risk and benefits and she is agreeable to proceeding.    I  Daniel Olivia MD  03/11/2022

## 2022-03-11 NOTE — PRE-PROCEDURE INSTRUCTIONS
PATIENT INSTRUCTED TO BE:    - NPO AFTER MIDNIGHT EXCEPT CAN HAVE CLEAR LIQUIDS 2 HOURS PRIOR TO SURGERY ARRIVAL TIME     - TO HOLD ALL VITAMINS, SUPPLEMENTS, NSAIDS FOR ONE WEEK PRIOR TO THEIR SURGICAL PROCEDURE    - INSTRUCTED PT TO USE SURGICAL SOAP 1 TIME THE NIGHT PRIOR TO SURGERY OR THE AM OF SURGERY.   USE SOAP FROM NECK TO TOES AVOID THEIR FACE, HAIR, AND PRIVATE PARTS. INSTRUCTED NO LOTIONS, JEWELRY, PIERCINGS, OR DEODORANT DAY OF SURGERY    - IF DIABETIC, CHECK BLOOD GLUCOSE IF LESS THAN 70 CALL PREOP AREA -AM OF SURGERY     - INSTRUCTED TO TAKE THE FOLLOWING MEDICATIONS THE DAY OF SURGERY:    PRILOSEC KATHY GAONA     PATIENT VERBALIZED UNDERSTANDING

## 2022-03-11 NOTE — TELEPHONE ENCOUNTER
Called pt. To discuss labs. Pt understood  ----- Message from LANDEN Méndez sent at 3/10/2022  6:45 PM EST -----  Reviewed MRI MRCP and discussed case with Dr. Ott. There are no stones present in her common bile duct. Would like to recheck liver enzymes and INR tomorrow 3/11/21. Orders have been placed. Please keep appointment with General Surgery on 3/11/21.

## 2022-03-12 LAB
COPPER SERPL-MCNC: 161 UG/DL (ref 80–158)
MITOCHONDRIA M2 IGG SER-ACNC: <20 UNITS (ref 0–20)
SMA IGG SER-ACNC: 6 UNITS (ref 0–19)

## 2022-03-15 ENCOUNTER — ANESTHESIA EVENT (OUTPATIENT)
Dept: PERIOP | Facility: HOSPITAL | Age: 37
End: 2022-03-15

## 2022-03-15 ENCOUNTER — TELEPHONE (OUTPATIENT)
Dept: GASTROENTEROLOGY | Facility: CLINIC | Age: 37
End: 2022-03-15

## 2022-03-15 ENCOUNTER — TELEPHONE (OUTPATIENT)
Dept: SURGERY | Facility: CLINIC | Age: 37
End: 2022-03-15

## 2022-03-15 NOTE — TELEPHONE ENCOUNTER
Called pt. To let know. Lourdes has ordered labs. Pt was unable to answer ----- Message from LANDEN Méndez sent at 3/14/2022  1:53 PM EDT -----  Need to continue trending liver enzymes and INR. Orders placed for repeat labs to be completed today or tomorrow.

## 2022-03-15 NOTE — TELEPHONE ENCOUNTER
Return pt. Call. Pt was unable to answer. Left a voicemail ----- Message from LANDEN Méndez sent at 3/14/2022  1:53 PM EDT -----  Need to continue trending liver enzymes and INR. Orders placed for repeat labs to be completed today or tomorrow.

## 2022-03-15 NOTE — TELEPHONE ENCOUNTER
Caller: Joelle Clement    Relationship to patient: Self    Best call back number: [phone]    Patient is needing: PT CALLED AND STATED THAT SHE MISSED A CALL FROM THE [place] OFFICE BUT THEY WERE CLOSED. PT IS SCHEDULED TO HAVE SURGERY TOMORROW AND IS NEEDING SOMEONE TO CALL HER BACK TODAY. UNABLE TO WARM TRANSFER

## 2022-03-16 ENCOUNTER — HOSPITAL ENCOUNTER (OUTPATIENT)
Facility: HOSPITAL | Age: 37
Setting detail: HOSPITAL OUTPATIENT SURGERY
Discharge: HOME | End: 2022-03-16
Attending: SURGERY | Admitting: SURGERY

## 2022-03-16 ENCOUNTER — ANESTHESIA (OUTPATIENT)
Dept: PERIOP | Facility: HOSPITAL | Age: 37
End: 2022-03-16

## 2022-03-16 ENCOUNTER — PATIENT ROUNDING (BHMG ONLY) (OUTPATIENT)
Dept: GASTROENTEROLOGY | Facility: CLINIC | Age: 37
End: 2022-03-16

## 2022-03-16 VITALS
WEIGHT: 204.15 LBS | DIASTOLIC BLOOD PRESSURE: 74 MMHG | SYSTOLIC BLOOD PRESSURE: 109 MMHG | BODY MASS INDEX: 32.81 KG/M2 | TEMPERATURE: 97.2 F | OXYGEN SATURATION: 93 % | HEIGHT: 66 IN | HEART RATE: 79 BPM | RESPIRATION RATE: 16 BRPM

## 2022-03-16 DIAGNOSIS — R79.89 ELEVATED LIVER FUNCTION TESTS: Primary | ICD-10-CM

## 2022-03-16 DIAGNOSIS — K80.20 GALLSTONES: ICD-10-CM

## 2022-03-16 LAB — B-HCG UR QL: NEGATIVE

## 2022-03-16 PROCEDURE — 47562 LAPAROSCOPIC CHOLECYSTECTOMY: CPT | Performed by: SPECIALIST/TECHNOLOGIST, OTHER

## 2022-03-16 PROCEDURE — 25010000002 DEXAMETHASONE PER 1 MG: Performed by: NURSE ANESTHETIST, CERTIFIED REGISTERED

## 2022-03-16 PROCEDURE — 47562 LAPAROSCOPIC CHOLECYSTECTOMY: CPT | Performed by: SURGERY

## 2022-03-16 PROCEDURE — 25010000002 FENTANYL CITRATE (PF) 50 MCG/ML SOLUTION: Performed by: NURSE ANESTHETIST, CERTIFIED REGISTERED

## 2022-03-16 PROCEDURE — 25010000002 PROPOFOL 10 MG/ML EMULSION: Performed by: NURSE ANESTHETIST, CERTIFIED REGISTERED

## 2022-03-16 PROCEDURE — 47000 NEEDLE BIOPSY OF LIVER PERQ: CPT | Performed by: SURGERY

## 2022-03-16 PROCEDURE — 25010000002 MIDAZOLAM PER 1 MG: Performed by: ANESTHESIOLOGY

## 2022-03-16 PROCEDURE — C1889 IMPLANT/INSERT DEVICE, NOC: HCPCS | Performed by: SURGERY

## 2022-03-16 PROCEDURE — 88304 TISSUE EXAM BY PATHOLOGIST: CPT | Performed by: SURGERY

## 2022-03-16 PROCEDURE — 81025 URINE PREGNANCY TEST: CPT | Performed by: SURGERY

## 2022-03-16 PROCEDURE — 88307 TISSUE EXAM BY PATHOLOGIST: CPT | Performed by: SURGERY

## 2022-03-16 PROCEDURE — 25010000002 ONDANSETRON PER 1 MG: Performed by: NURSE ANESTHETIST, CERTIFIED REGISTERED

## 2022-03-16 PROCEDURE — 25010000002 HYDROMORPHONE 1 MG/ML SOLUTION: Performed by: NURSE ANESTHETIST, CERTIFIED REGISTERED

## 2022-03-16 PROCEDURE — 88313 SPECIAL STAINS GROUP 2: CPT | Performed by: SURGERY

## 2022-03-16 DEVICE — LIGACLIP 10-M/L, 10MM ENDOSCOPIC ROTATING MULTIPLE CLIP APPLIERS
Type: IMPLANTABLE DEVICE | Site: ABDOMEN | Status: FUNCTIONAL
Brand: LIGACLIP

## 2022-03-16 RX ORDER — ONDANSETRON 2 MG/ML
4 INJECTION INTRAMUSCULAR; INTRAVENOUS ONCE AS NEEDED
Status: DISCONTINUED | OUTPATIENT
Start: 2022-03-16 | End: 2022-03-16 | Stop reason: HOSPADM

## 2022-03-16 RX ORDER — INDOCYANINE GREEN AND WATER 25 MG
1.25 KIT INJECTION ONCE
Status: COMPLETED | OUTPATIENT
Start: 2022-03-16 | End: 2022-03-16

## 2022-03-16 RX ORDER — PROPOFOL 10 MG/ML
VIAL (ML) INTRAVENOUS AS NEEDED
Status: DISCONTINUED | OUTPATIENT
Start: 2022-03-16 | End: 2022-03-16 | Stop reason: SURG

## 2022-03-16 RX ORDER — HYDROCODONE BITARTRATE AND ACETAMINOPHEN 5; 325 MG/1; MG/1
1 TABLET ORAL ONCE AS NEEDED
Status: DISCONTINUED | OUTPATIENT
Start: 2022-03-16 | End: 2022-03-16 | Stop reason: HOSPADM

## 2022-03-16 RX ORDER — ONDANSETRON 4 MG/1
4 TABLET, FILM COATED ORAL ONCE AS NEEDED
Status: DISCONTINUED | OUTPATIENT
Start: 2022-03-16 | End: 2022-03-16 | Stop reason: HOSPADM

## 2022-03-16 RX ORDER — PROMETHAZINE HYDROCHLORIDE 12.5 MG/1
25 TABLET ORAL ONCE AS NEEDED
Status: DISCONTINUED | OUTPATIENT
Start: 2022-03-16 | End: 2022-03-16 | Stop reason: HOSPADM

## 2022-03-16 RX ORDER — SODIUM CHLORIDE 0.9 % (FLUSH) 0.9 %
10 SYRINGE (ML) INJECTION AS NEEDED
Status: DISCONTINUED | OUTPATIENT
Start: 2022-03-16 | End: 2022-03-16 | Stop reason: HOSPADM

## 2022-03-16 RX ORDER — BUPIVACAINE HYDROCHLORIDE AND EPINEPHRINE 2.5; 5 MG/ML; UG/ML
INJECTION, SOLUTION EPIDURAL; INFILTRATION; INTRACAUDAL; PERINEURAL AS NEEDED
Status: DISCONTINUED | OUTPATIENT
Start: 2022-03-16 | End: 2022-03-16 | Stop reason: HOSPADM

## 2022-03-16 RX ORDER — GLYCOPYRROLATE 0.2 MG/ML
0.2 INJECTION INTRAMUSCULAR; INTRAVENOUS
Status: COMPLETED | OUTPATIENT
Start: 2022-03-16 | End: 2022-03-16

## 2022-03-16 RX ORDER — LIDOCAINE HYDROCHLORIDE 20 MG/ML
INJECTION, SOLUTION INFILTRATION; PERINEURAL AS NEEDED
Status: DISCONTINUED | OUTPATIENT
Start: 2022-03-16 | End: 2022-03-16 | Stop reason: SURG

## 2022-03-16 RX ORDER — DEXAMETHASONE SODIUM PHOSPHATE 4 MG/ML
INJECTION, SOLUTION INTRA-ARTICULAR; INTRALESIONAL; INTRAMUSCULAR; INTRAVENOUS; SOFT TISSUE AS NEEDED
Status: DISCONTINUED | OUTPATIENT
Start: 2022-03-16 | End: 2022-03-16 | Stop reason: SURG

## 2022-03-16 RX ORDER — SODIUM CHLORIDE, SODIUM LACTATE, POTASSIUM CHLORIDE, CALCIUM CHLORIDE 600; 310; 30; 20 MG/100ML; MG/100ML; MG/100ML; MG/100ML
9 INJECTION, SOLUTION INTRAVENOUS CONTINUOUS PRN
Status: DISCONTINUED | OUTPATIENT
Start: 2022-03-16 | End: 2022-03-16 | Stop reason: HOSPADM

## 2022-03-16 RX ORDER — HYDROCODONE BITARTRATE AND ACETAMINOPHEN 5; 325 MG/1; MG/1
1-2 TABLET ORAL EVERY 4 HOURS PRN
Qty: 10 TABLET | Refills: 0 | Status: SHIPPED | OUTPATIENT
Start: 2022-03-16 | End: 2023-01-18

## 2022-03-16 RX ORDER — IBUPROFEN 600 MG/1
600 TABLET ORAL EVERY 6 HOURS PRN
Status: DISCONTINUED | OUTPATIENT
Start: 2022-03-16 | End: 2022-03-16 | Stop reason: HOSPADM

## 2022-03-16 RX ORDER — SCOLOPAMINE TRANSDERMAL SYSTEM 1 MG/1
1 PATCH, EXTENDED RELEASE TRANSDERMAL CONTINUOUS
Status: DISCONTINUED | OUTPATIENT
Start: 2022-03-16 | End: 2022-03-16 | Stop reason: HOSPADM

## 2022-03-16 RX ORDER — ESMOLOL HYDROCHLORIDE 10 MG/ML
INJECTION INTRAVENOUS AS NEEDED
Status: DISCONTINUED | OUTPATIENT
Start: 2022-03-16 | End: 2022-03-16 | Stop reason: SURG

## 2022-03-16 RX ORDER — SUCCINYLCHOLINE/SOD CL,ISO/PF 100 MG/5ML
SYRINGE (ML) INTRAVENOUS AS NEEDED
Status: DISCONTINUED | OUTPATIENT
Start: 2022-03-16 | End: 2022-03-16 | Stop reason: SURG

## 2022-03-16 RX ORDER — SODIUM CHLORIDE 0.9 % (FLUSH) 0.9 %
10 SYRINGE (ML) INJECTION EVERY 12 HOURS SCHEDULED
Status: DISCONTINUED | OUTPATIENT
Start: 2022-03-16 | End: 2022-03-16 | Stop reason: HOSPADM

## 2022-03-16 RX ORDER — SODIUM CHLORIDE, SODIUM LACTATE, POTASSIUM CHLORIDE, CALCIUM CHLORIDE 600; 310; 30; 20 MG/100ML; MG/100ML; MG/100ML; MG/100ML
70 INJECTION, SOLUTION INTRAVENOUS CONTINUOUS
Status: DISCONTINUED | OUTPATIENT
Start: 2022-03-16 | End: 2022-03-16 | Stop reason: HOSPADM

## 2022-03-16 RX ORDER — ROCURONIUM BROMIDE 10 MG/ML
INJECTION, SOLUTION INTRAVENOUS AS NEEDED
Status: DISCONTINUED | OUTPATIENT
Start: 2022-03-16 | End: 2022-03-16 | Stop reason: SURG

## 2022-03-16 RX ORDER — FENTANYL CITRATE 50 UG/ML
INJECTION, SOLUTION INTRAMUSCULAR; INTRAVENOUS AS NEEDED
Status: DISCONTINUED | OUTPATIENT
Start: 2022-03-16 | End: 2022-03-16 | Stop reason: SURG

## 2022-03-16 RX ORDER — PROMETHAZINE HYDROCHLORIDE 25 MG/1
25 SUPPOSITORY RECTAL ONCE AS NEEDED
Status: DISCONTINUED | OUTPATIENT
Start: 2022-03-16 | End: 2022-03-16 | Stop reason: HOSPADM

## 2022-03-16 RX ORDER — OXYCODONE HYDROCHLORIDE 5 MG/1
5 TABLET ORAL
Status: DISCONTINUED | OUTPATIENT
Start: 2022-03-16 | End: 2022-03-16 | Stop reason: HOSPADM

## 2022-03-16 RX ORDER — MIDAZOLAM HYDROCHLORIDE 1 MG/ML
2 INJECTION INTRAMUSCULAR; INTRAVENOUS ONCE
Status: COMPLETED | OUTPATIENT
Start: 2022-03-16 | End: 2022-03-16

## 2022-03-16 RX ORDER — MEPERIDINE HYDROCHLORIDE 25 MG/ML
12.5 INJECTION INTRAMUSCULAR; INTRAVENOUS; SUBCUTANEOUS
Status: DISCONTINUED | OUTPATIENT
Start: 2022-03-16 | End: 2022-03-16 | Stop reason: HOSPADM

## 2022-03-16 RX ORDER — ONDANSETRON 2 MG/ML
INJECTION INTRAMUSCULAR; INTRAVENOUS AS NEEDED
Status: DISCONTINUED | OUTPATIENT
Start: 2022-03-16 | End: 2022-03-16 | Stop reason: SURG

## 2022-03-16 RX ADMIN — HYDROMORPHONE HYDROCHLORIDE 0.5 MG: 1 INJECTION, SOLUTION INTRAMUSCULAR; INTRAVENOUS; SUBCUTANEOUS at 09:17

## 2022-03-16 RX ADMIN — ONDANSETRON 4 MG: 2 INJECTION INTRAMUSCULAR; INTRAVENOUS at 08:36

## 2022-03-16 RX ADMIN — LIDOCAINE HYDROCHLORIDE 100 MG: 20 INJECTION, SOLUTION INFILTRATION; PERINEURAL at 07:35

## 2022-03-16 RX ADMIN — ROCURONIUM BROMIDE 10 MG: 10 INJECTION INTRAVENOUS at 08:25

## 2022-03-16 RX ADMIN — MIDAZOLAM HYDROCHLORIDE 2 MG: 1 INJECTION, SOLUTION INTRAMUSCULAR; INTRAVENOUS at 07:24

## 2022-03-16 RX ADMIN — FENTANYL CITRATE 50 MCG: 50 INJECTION, SOLUTION INTRAMUSCULAR; INTRAVENOUS at 07:35

## 2022-03-16 RX ADMIN — HYDROMORPHONE HYDROCHLORIDE 0.5 MG: 1 INJECTION, SOLUTION INTRAMUSCULAR; INTRAVENOUS; SUBCUTANEOUS at 09:02

## 2022-03-16 RX ADMIN — FENTANYL CITRATE 50 MCG: 50 INJECTION, SOLUTION INTRAMUSCULAR; INTRAVENOUS at 07:59

## 2022-03-16 RX ADMIN — SODIUM CHLORIDE, POTASSIUM CHLORIDE, SODIUM LACTATE AND CALCIUM CHLORIDE 9 ML/HR: 600; 310; 30; 20 INJECTION, SOLUTION INTRAVENOUS at 07:24

## 2022-03-16 RX ADMIN — DEXAMETHASONE SODIUM PHOSPHATE 4 MG: 4 INJECTION INTRA-ARTICULAR; INTRALESIONAL; INTRAMUSCULAR; INTRAVENOUS; SOFT TISSUE at 07:35

## 2022-03-16 RX ADMIN — ESMOLOL HYDROCHLORIDE 10 MG: 10 INJECTION INTRAVENOUS at 08:14

## 2022-03-16 RX ADMIN — SCOPALAMINE 1 PATCH: 1 PATCH, EXTENDED RELEASE TRANSDERMAL at 07:28

## 2022-03-16 RX ADMIN — INDOCYANINE GREEN AND WATER 25 MG: KIT at 06:47

## 2022-03-16 RX ADMIN — SUGAMMADEX 200 MG: 100 INJECTION, SOLUTION INTRAVENOUS at 08:40

## 2022-03-16 RX ADMIN — Medication 100 MG: at 07:35

## 2022-03-16 RX ADMIN — FENTANYL CITRATE 50 MCG: 50 INJECTION, SOLUTION INTRAMUSCULAR; INTRAVENOUS at 07:32

## 2022-03-16 RX ADMIN — ROCURONIUM BROMIDE 50 MG: 10 INJECTION INTRAVENOUS at 07:47

## 2022-03-16 RX ADMIN — GLYCOPYRROLATE 0.2 MG: 0.2 INJECTION, SOLUTION INTRAMUSCULAR; INTRAVENOUS at 07:24

## 2022-03-16 RX ADMIN — PROPOFOL 200 MG: 10 INJECTION, EMULSION INTRAVENOUS at 07:35

## 2022-03-16 RX ADMIN — FENTANYL CITRATE 50 MCG: 50 INJECTION, SOLUTION INTRAMUSCULAR; INTRAVENOUS at 07:43

## 2022-03-16 NOTE — ANESTHESIA PREPROCEDURE EVALUATION
Anesthesia Evaluation     Patient summary reviewed and Nursing notes reviewed   no history of anesthetic complications:  NPO Solid Status: > 8 hours  NPO Liquid Status: > 2 hours           Airway   Mallampati: II  TM distance: >3 FB  Neck ROM: full  No difficulty expected  Dental      Pulmonary - negative pulmonary ROS and normal exam    breath sounds clear to auscultation  Cardiovascular - negative cardio ROS and normal exam  Exercise tolerance: good (4-7 METS)    Rhythm: regular  Rate: normal        Neuro/Psych- negative ROS  GI/Hepatic/Renal/Endo    (+) obesity,  GERD,  liver disease history of elevated LFT, thyroid problem hypothyroidism    Musculoskeletal     Abdominal    Substance History      OB/GYN          Other                        Anesthesia Plan    ASA 3     general       Anesthetic plan, all risks, benefits, and alternatives have been provided, discussed and informed consent has been obtained with: patient.        CODE STATUS:

## 2022-03-16 NOTE — OP NOTE
CHOLECYSTECTOMY LAPAROSCOPIC, LIVER BIOPSY  Procedure Report    Patient Name:  Joelle Clement  YOB: 1985    Date of Surgery:  3/16/2022     Indications: The patient is a 36-year-old female that presented with symptomatic gallstones and elevated liver function test.  The decision was made to proceed with a laparoscopic cholecystectomy and a liver biopsy.    Pre-op Diagnosis: #1 symptomatic gallstones #2 elevated liver function test    Post-Op Diagnosis: Same with significant chronic cholecystitis    Procedure/CPT® Codes:    Laparoscopic cholecystectomy #2 needle liver biopsy    Staff:  Surgeon(s):  Daniel Olivia MD    Assistant: Payton Cramer CSA    Anesthesia: General    Estimated Blood Loss: 10 mL    Implants:    Implant Name Type Inv. Item Serial No.  Lot No. LRB No. Used Action   CLIPAPPLR M/ ENDO LIGACLIP ROT 10MM MD/LG - [id number] Implant CLIPAPPLR M/ ENDO LIGACLIP ROT 10MM MD/LG  [employer]  DIV OF J AND J [id number] N/A 1 Implanted   HEMOST ABS SURGICEL NUKNIT 6X9 - [id number] Implant HEMOST ABS SURGICEL NUKNIT 6X9  [employer]  DIV OF J AND J [id number] N/A 1 Implanted       Specimen:          Specimens     ID Source Type Tests Collected By Collected At Frozen?    A Gallbladder Tissue · TISSUE PATHOLOGY EXAM   Daniel Olivia MD 3/16/22 0756     This specimen was not marked as sent.    B Liver Tissue · TISSUE PATHOLOGY EXAM   Daniel Olivia MD 3/16/22 0756     Description: liver biopsy    This specimen was not marked as sent.              Findings: Chronic cholecystitis #2 normal-appearing liver    Complications: None    Description of Procedure: The patient was taken to the operating room and placed on the table in supine position.  After induction of general endotracheal anesthesia, the abdomen was prepped and draped sterilely.  The abdomen was insufflated with a Veress needle placed above the umbilicus and a 5 mm supraumbilical port was placed into the  peritoneal cavity using a Visiport.  A 12 mm epigastric port and two 5 mm right flank ports were then placed under direct vision.  The gallbladder was identified and was noted to be chronically inflamed and thickened.  The dome of the gallbladder was grasped and retracted cephalad and the infundibulum was grasped and retracted inferiorly and laterally.  The gallbladder cystic duct junction was then dissected and a critical view of safety was obtained.  The camera was switched over to ICG mode and we clearly saw the cystic duct coming up into the infundibulum of the gallbladder and we visualized the common bile duct medially.  The cystic duct was then clipped 3 times proximally and once distally and then divided with scissors.  The cystic artery was then dissected and clipped 3 times proximally and once distally and then also divided with scissors.  The gallbladder was then dissected free from the gallbladder fossa with cautery and brought out through the epigastric port site in an Endopouch bag.  She had gallstones that were so large I had to enlarge the epigastric incision to remove the gallbladder.  The ports were replaced and the operative field was irrigated out with sterile saline.  We appear to have good hemostasis and I saw no evidence of a bile leak.  I then made a small stab incision in the right upper quadrant and using a needle biopsy gun obtained to core liver biopsies and sent those for specimen as well.  I achieved adequate hemostasis with cautery.  The abdomen was then desufflated and her ports were removed.  The epigastric port site fascial defect was then closed with a figure-of-eight 0 Mersilene suture.  All skin incisions were closed with 4-0 Vicryl subcuticular sutures.  Sterile dressings were applied and she was taken the postanesthesia recovery room in stable condition.        Assistant: Payton Cramer CSA  was responsible for performing the following activities: Retraction, Suction,  Suturing, Placing Dressing and Held/Positioned Camera and their skilled assistance was necessary for the success of this case.    Daniel Olivia MD     Date: 3/16/2022  Time: 08:44 EDT

## 2022-03-16 NOTE — DISCHARGE INSTRUCTIONS
DISCHARGE INSTRUCTIONS LAPAROSCOPIC CHOLECYSTECTOMY/APPENDECTOMY  (GALL BLADDER)      For your surgery you had:  General anesthesia (you may have a sore throat for the first 24 hours)  IV sedation  Local anesthesia  Monitored anesthesia care  You received a medicated patch for nausea prevention today (behind your ear). It is recommended that you remove it 24-48 hours post-operatively. It must be removed within 72 hours.  You received an anesthesia medication today that can cause hormonal forms of birth control to be ineffective. You should use a different form of birth control (to prevent pregnancy) for 7 days.   You may experience dizziness, drowsiness, or light-headedness for several hours following surgery.  Do not stay alone tonight.  Limit your activity for 24 hours.  Resume your diet slowly.  Follow whatever special dietary instructions you may have been given by your doctor.  You should not drive, operate machinery, drink alcohol, or sign legally binding documents for 24 hours or while you are taking pain medication.  Last dose of pain medication was given at:   .    NOTIFY YOUR DOCTOR IF YOU EXPERIENCE ANY OF THE FOLLOWING:  Temperature greater than 101 degrees Fahrenheit  Shaking Chills  Redness or excessive drainage from incision  Nausea, vomiting and/or pain that is not controlled by prescribed medications  Increase in bleeding or bleeding that is excessive  Unable to urinate in 6 hours after surgery  If unable to reach your doctor, please go to the closest Emergency Room You may remove Band-Aid/dressing   .  You may shower or bathe  .  Apply an ice pack 24-48 hours.  You may experience gas discomfort 24-48 hours after discharge, especially in chest and shoulders.  Changing position frequently may alleviate this discomfort.  If you have excessive pain, swelling, redness, drainage or other problems, notify your physician.  If unable to urinate in 6 to 8 hours after surgery or urinating frequently in  small amounts, notify your doctor or go to the nearest Emergency Room.  Medications per physician instructions as indicated on Discharge Medication Information Sheet.  You should see   for follow-up care  on  .  Phone number:      SPECIAL INSTRUCTIONS:                        I have read and received the above instructions.     Patient/Responsible Party's Signature Date/Time     RN Signature Date/Time

## 2022-03-16 NOTE — ANESTHESIA POSTPROCEDURE EVALUATION
Patient: Joelle Clement    Procedure Summary     Date: 03/16/22 Room / Location: [facility]  /  [facility]    Anesthesia Start: 0730 Anesthesia Stop: 0857    Procedures:       LAPAROSCOPIC CHOLECYSTECTOMY (N/A Abdomen)      LIVER BIOPSY (N/A Abdomen) Diagnosis:       Gallstones      (Gallstones [K80.20])    Surgeons: Daniel Olivia MD Provider: Jann Townsend MD    Anesthesia Type: general ASA Status: 3          Anesthesia Type: general    Vitals  Vitals Value Taken Time   /87 03/16/22 0920   Temp 36.2 °C (97.2 °F) 03/16/22 0855   Pulse 113 03/16/22 0922   Resp 20 03/16/22 0900   SpO2 97 % 03/16/22 0922   Vitals shown include unvalidated device data.        Post Anesthesia Care and Evaluation    Patient location during evaluation: bedside  Patient participation: complete - patient participated  Level of consciousness: awake  Pain management: adequate  Airway patency: patent  Anesthetic complications: No anesthetic complications  PONV Status: none  Cardiovascular status: acceptable and stable  Respiratory status: acceptable  Hydration status: acceptable    Comments: An Anesthesiologist personally participated in the most demanding procedures (including induction and emergence if applicable) in the anesthesia plan, monitored the course of anesthesia administration at frequent intervals and remained physically present and available for immediate diagnosis and treatment of emergencies.

## 2022-03-18 ENCOUNTER — TELEPHONE (OUTPATIENT)
Dept: GASTROENTEROLOGY | Facility: CLINIC | Age: 37
End: 2022-03-18

## 2022-03-18 NOTE — TELEPHONE ENCOUNTER
Called pt to let her know juliette would like for her to complete labs. Pt was unable to answer. Left pt a  with a callback number

## 2022-03-21 ENCOUNTER — LAB (OUTPATIENT)
Dept: LAB | Facility: HOSPITAL | Age: 37
End: 2022-03-21

## 2022-03-21 DIAGNOSIS — R74.8 ELEVATED LIVER ENZYMES: ICD-10-CM

## 2022-03-21 LAB
ALBUMIN SERPL-MCNC: 4.6 G/DL (ref 3.5–5.2)
ALP SERPL-CCNC: 105 U/L (ref 39–117)
ALT SERPL W P-5'-P-CCNC: 1166 U/L (ref 1–33)
AST SERPL-CCNC: 572 U/L (ref 1–32)
BILIRUB CONJ SERPL-MCNC: 0.7 MG/DL (ref 0–0.3)
BILIRUB INDIRECT SERPL-MCNC: 0.6 MG/DL
BILIRUB SERPL-MCNC: 1.3 MG/DL (ref 0–1.2)
INR PPP: 1.13 (ref 2–3)
PROT SERPL-MCNC: 7.5 G/DL (ref 6–8.5)
PROTHROMBIN TIME: 11.6 SECONDS (ref 9.4–12)

## 2022-03-21 PROCEDURE — 80076 HEPATIC FUNCTION PANEL: CPT

## 2022-03-21 PROCEDURE — 85610 PROTHROMBIN TIME: CPT

## 2022-03-21 PROCEDURE — 36415 COLL VENOUS BLD VENIPUNCTURE: CPT

## 2022-03-23 ENCOUNTER — TELEPHONE (OUTPATIENT)
Dept: GASTROENTEROLOGY | Facility: CLINIC | Age: 37
End: 2022-03-23

## 2022-03-23 NOTE — TELEPHONE ENCOUNTER
Called pt. She said not having much of an appetite, but that wasn't a change, told pt to call if new or changed symptoms. ----- Message from LANDEN Méndez sent at 3/23/2022  8:42 AM EDT -----  Spoke with Dr. Corley about elevating LFTs. Please check with patient to see how she is feeling. Is she having right upper quadrant pain, fatigue, or nausea? We will wait for more lab work depending on the results of the liver biopsy. Please have her call us if she is having any new or changing symptoms. Dr. Corley will call the patient with liver biopsy results.

## 2022-03-26 LAB
CYTO UR: NORMAL
LAB AP CASE REPORT: NORMAL
LAB AP CLINICAL INFORMATION: NORMAL
LAB AP DIAGNOSIS COMMENT: NORMAL
LAB AP SPECIAL STAINS: NORMAL
PATH REPORT.FINAL DX SPEC: NORMAL
PATH REPORT.GROSS SPEC: NORMAL

## 2022-03-29 ENCOUNTER — OFFICE VISIT (OUTPATIENT)
Dept: SURGERY | Facility: CLINIC | Age: 37
End: 2022-03-29

## 2022-03-29 ENCOUNTER — TELEPHONE (OUTPATIENT)
Dept: GASTROENTEROLOGY | Facility: CLINIC | Age: 37
End: 2022-03-29

## 2022-03-29 VITALS — BODY MASS INDEX: 32.78 KG/M2 | HEIGHT: 66 IN | TEMPERATURE: 98 F | WEIGHT: 204 LBS

## 2022-03-29 DIAGNOSIS — R79.89 ELEVATED LIVER FUNCTION TESTS: ICD-10-CM

## 2022-03-29 DIAGNOSIS — R74.8 ELEVATED LIVER ENZYMES: Primary | ICD-10-CM

## 2022-03-29 DIAGNOSIS — K80.20 GALLSTONES: Primary | ICD-10-CM

## 2022-03-29 PROCEDURE — 99024 POSTOP FOLLOW-UP VISIT: CPT | Performed by: SURGERY

## 2022-03-29 NOTE — PROGRESS NOTES
"Chief Complaint  Post-op (LAPAROSCOPIC CHOLECYSTECTOMY/LIVER BIOPSY 3/16/22)    Subjective          Joelle Clement presents to [facility] GENERAL SUR[facility]ERY  History of Present Illness    Joelle Clement is a 36 y.o. female  who presents today for a postoperative visit.     Patient is here for a follow-up after a recent laparoscopic cholecystectomy and needle liver biopsy.  Her gallbladder pathology just revealed gallstones and some mild chronic cholecystitis.  Her liver biopsy came back consistent with an acute hepatitis and no evidence of fibrosis.  At the time of her surgery her liver looked completely normal.    Past History:  Medical History: has a past medical history of Cholelithiasis, Depression, Disease of thyroid gland, Fatty liver (3/3/22), Gallstones, and GERD (gastroesophageal reflux disease).   Surgical History: has a past surgical history that includes US Guided Fine Needle Aspiration (01/12/2021); Tonsillectomy; Adenoidectomy; Cholecystectomy (N/A, 3/16/2022); and Liver biopsy (N/A, 3/16/2022).   Family History: family history includes Colon cancer in her maternal uncle.   Social History: reports that she has never smoked. She has never used smokeless tobacco. She reports that she does not drink alcohol and does not use drugs.  Allergies: Patient has no known allergies.       Current Outpatient Medications:   •  levonorgestrel-ethinyl estradiol (SEASONALE) 0.15-0.03 MG per tablet, Take 1 tablet by mouth Daily., Disp: 91 tablet, Rfl: 3  •  omeprazole (priLOSEC) 20 MG capsule, Take 20 mg by mouth Daily As Needed., Disp: , Rfl:   •  HYDROcodone-acetaminophen (NORCO) 5-325 MG per tablet, Take 1-2 tablets by mouth Every 4 (Four) Hours As Needed (Pain)., Disp: 10 tablet, Rfl: 0       Physical Exam  She appears well today.  Her incisions look good.  Objective     Vital Signs:   Temp 98 °F (36.7 °C)   Ht 167.6 cm (65.98\")   Wt 92.5 kg (204 lb)   BMI 32.94 kg/m²              Assessment and " Plan    Diagnoses and all orders for this visit:    1. Gallstones (Primary)    2. Elevated liver function tests    At this point I am going to see her back on an as-needed basis.  I have asked her to give me a call if she should have any further problems.  I also told her that I would reach out to the gastroenterologist and see if they had received any communication.

## 2022-03-29 NOTE — TELEPHONE ENCOUNTER
Called pt to let her know that her previous CBC was normal. Due to elevated liver enzymes Dr. freeman wants to d/c all current medications and do a repeat on labs this week. Pt did not answer, a detailed message was left

## 2022-04-01 ENCOUNTER — LAB (OUTPATIENT)
Dept: LAB | Facility: HOSPITAL | Age: 37
End: 2022-04-01

## 2022-04-01 DIAGNOSIS — R74.8 ELEVATED LIVER ENZYMES: ICD-10-CM

## 2022-04-01 LAB
ALBUMIN SERPL-MCNC: 4.2 G/DL (ref 3.5–5.2)
ALBUMIN/GLOB SERPL: 1.8 G/DL
ALP SERPL-CCNC: 92 U/L (ref 39–117)
ALT SERPL W P-5'-P-CCNC: 127 U/L (ref 1–33)
ANION GAP SERPL CALCULATED.3IONS-SCNC: 10.2 MMOL/L (ref 5–15)
AST SERPL-CCNC: 30 U/L (ref 1–32)
BILIRUB SERPL-MCNC: 0.4 MG/DL (ref 0–1.2)
BUN SERPL-MCNC: 13 MG/DL (ref 6–20)
BUN/CREAT SERPL: 17.8 (ref 7–25)
CALCIUM SPEC-SCNC: 9.4 MG/DL (ref 8.6–10.5)
CHLORIDE SERPL-SCNC: 107 MMOL/L (ref 98–107)
CO2 SERPL-SCNC: 22.8 MMOL/L (ref 22–29)
CREAT SERPL-MCNC: 0.73 MG/DL (ref 0.57–1)
EGFRCR SERPLBLD CKD-EPI 2021: 109.5 ML/MIN/1.73
GLOBULIN UR ELPH-MCNC: 2.3 GM/DL
GLUCOSE SERPL-MCNC: 83 MG/DL (ref 65–99)
INR PPP: 1 (ref 2–3)
POTASSIUM SERPL-SCNC: 4.2 MMOL/L (ref 3.5–5.2)
PROT SERPL-MCNC: 6.5 G/DL (ref 6–8.5)
PROTHROMBIN TIME: 10.5 SECONDS (ref 9.4–12)
SODIUM SERPL-SCNC: 140 MMOL/L (ref 136–145)

## 2022-04-01 PROCEDURE — 85610 PROTHROMBIN TIME: CPT

## 2022-04-01 PROCEDURE — 80053 COMPREHEN METABOLIC PANEL: CPT

## 2022-04-01 PROCEDURE — 36415 COLL VENOUS BLD VENIPUNCTURE: CPT

## 2022-04-06 ENCOUNTER — TELEPHONE (OUTPATIENT)
Dept: GASTROENTEROLOGY | Facility: CLINIC | Age: 37
End: 2022-04-06

## 2022-06-28 ENCOUNTER — TELEPHONE (OUTPATIENT)
Dept: FAMILY MEDICINE CLINIC | Age: 37
End: 2022-06-28

## 2022-06-28 DIAGNOSIS — E04.2 MULTINODULAR GOITER: Primary | ICD-10-CM

## 2022-06-28 NOTE — TELEPHONE ENCOUNTER
It has been close to 18 months since any last had her multinodular goiter evaluated with ultrasound.  Biopsy done on 2T RADS 4 lesions in March of last year were benign.  Order placed for thyroid ultrasound after discussion with Joelle.  Rebecca, TONY

## 2022-07-12 ENCOUNTER — LAB (OUTPATIENT)
Dept: LAB | Facility: HOSPITAL | Age: 37
End: 2022-07-12

## 2022-07-12 DIAGNOSIS — R74.8 ELEVATED LIVER ENZYMES: ICD-10-CM

## 2022-07-12 LAB
ALBUMIN SERPL-MCNC: 4.7 G/DL (ref 3.5–5.2)
ALP SERPL-CCNC: 90 U/L (ref 39–117)
ALT SERPL W P-5'-P-CCNC: 32 U/L (ref 1–33)
AST SERPL-CCNC: 22 U/L (ref 1–32)
BILIRUB CONJ SERPL-MCNC: 0.2 MG/DL (ref 0–0.3)
BILIRUB INDIRECT SERPL-MCNC: 0.4 MG/DL
BILIRUB SERPL-MCNC: 0.6 MG/DL (ref 0–1.2)
PROT SERPL-MCNC: 7.7 G/DL (ref 6–8.5)

## 2022-07-12 PROCEDURE — 80076 HEPATIC FUNCTION PANEL: CPT

## 2022-07-12 PROCEDURE — 36415 COLL VENOUS BLD VENIPUNCTURE: CPT

## 2022-07-15 ENCOUNTER — HOSPITAL ENCOUNTER (OUTPATIENT)
Dept: ULTRASOUND IMAGING | Facility: HOSPITAL | Age: 37
Discharge: HOME | End: 2022-07-15
Admitting: FAMILY MEDICINE

## 2022-07-15 DIAGNOSIS — E04.2 MULTINODULAR GOITER: ICD-10-CM

## 2022-07-15 PROCEDURE — 76536 US EXAM OF HEAD AND NECK: CPT

## 2022-10-12 ENCOUNTER — TELEPHONE (OUTPATIENT)
Dept: FAMILY MEDICINE CLINIC | Age: 37
End: 2022-10-12

## 2022-10-12 RX ORDER — ONDANSETRON 8 MG/1
8 TABLET, ORALLY DISINTEGRATING ORAL EVERY 8 HOURS PRN
Qty: 30 TABLET | Refills: 0 | Status: SHIPPED | OUTPATIENT
Start: 2022-10-12 | End: 2023-01-18

## 2022-11-29 RX ORDER — LEVONORGESTREL AND ETHINYL ESTRADIOL 0.15-0.03
1 KIT ORAL DAILY
Qty: 91 TABLET | Refills: 0 | Status: SHIPPED | OUTPATIENT
Start: 2022-11-29 | End: 2023-01-18 | Stop reason: SDUPTHER

## 2022-11-29 NOTE — TELEPHONE ENCOUNTER
Needs appt for further refills (annual physical please!  Got to keep our Joelle healthy).  Thanks, BZM

## 2023-01-18 ENCOUNTER — OFFICE VISIT (OUTPATIENT)
Dept: FAMILY MEDICINE CLINIC | Age: 38
End: 2023-01-18
Payer: COMMERCIAL

## 2023-01-18 VITALS
HEIGHT: 66 IN | WEIGHT: 208 LBS | DIASTOLIC BLOOD PRESSURE: 88 MMHG | SYSTOLIC BLOOD PRESSURE: 129 MMHG | HEART RATE: 81 BPM | BODY MASS INDEX: 33.43 KG/M2

## 2023-01-18 DIAGNOSIS — N92.0 MENORRHAGIA WITH REGULAR CYCLE: ICD-10-CM

## 2023-01-18 DIAGNOSIS — F33.42 RECURRENT MAJOR DEPRESSIVE DISORDER, IN FULL REMISSION: ICD-10-CM

## 2023-01-18 DIAGNOSIS — Z86.16 HISTORY OF COVID-19: ICD-10-CM

## 2023-01-18 DIAGNOSIS — Z00.00 ANNUAL PHYSICAL EXAM: Primary | ICD-10-CM

## 2023-01-18 DIAGNOSIS — R53.83 OTHER FATIGUE: ICD-10-CM

## 2023-01-18 DIAGNOSIS — E55.9 VITAMIN D DEFICIENCY: ICD-10-CM

## 2023-01-18 PROBLEM — R79.89 ELEVATED LIVER FUNCTION TESTS: Status: RESOLVED | Noted: 2022-03-11 | Resolved: 2023-01-18

## 2023-01-18 PROBLEM — R10.13 EPIGASTRIC PAIN: Status: RESOLVED | Noted: 2022-03-01 | Resolved: 2023-01-18

## 2023-01-18 PROBLEM — K80.20 GALLSTONES: Status: RESOLVED | Noted: 2022-03-11 | Resolved: 2023-01-18

## 2023-01-18 PROCEDURE — 99395 PREV VISIT EST AGE 18-39: CPT | Performed by: FAMILY MEDICINE

## 2023-01-18 RX ORDER — LEVONORGESTREL AND ETHINYL ESTRADIOL 0.15-0.03
1 KIT ORAL DAILY
Qty: 91 TABLET | Refills: 3 | Status: SHIPPED | OUTPATIENT
Start: 2023-01-18

## 2023-01-18 NOTE — ASSESSMENT & PLAN NOTE
Chronic fatigue, worsened since her COVID infection in [place].  Checking labs today below to look for abnormalities.  If no abnormalities, we will plan to give her 3 months to see if fatigue improves post-COVID.  At the end of that time, we may need to consider sending her for sleep study to look for sleep apnea.

## 2023-01-18 NOTE — ASSESSMENT & PLAN NOTE
She is UTD on pap smear, last done 12/2021 and this showed NIL with negative HR-HPV.  Five year repeat recommended.  She is UTD on COVID (due for bivalent booster - declines), Tdap (9/2020), and flu (9/2022).  She is due for routine labs including CMP and lipids; ordered.

## 2023-01-18 NOTE — PROGRESS NOTES
Chief Complaint  Annual Exam    Subjective          Joelle Clement presents to [facility] today for her annual physical.      She is UTD on pap smear, last done 12/2021 and this showed NIL with negative HR-HPV.  Five year repeat recommended.  She is UTD on COVID (due for bivalent booster), Tdap (9/2020), and flu (9/2022).  She is due for routine labs including CMP and lipids.    She had COVID over the week of Thanksgiving.  The infection itself was fairly mild.  Since then, she has had night sweats and fatigue.  The fatigue has been persistent.  It is not debilitating, but it is a long-term problem (although worse since COVID).  She does snore at night (has gotten worse over time).  Has never been evaluated for sleep apnea.      She is on Linzess for chronic constipation.  Uses it mostly on the weekends when needed since it cleans her out quickly and thoroughly.  She has been taking fiber regularly too, and that generally works to control her sx on a day-to-day basis.      Review of Systems   Constitutional: Positive for fatigue. Negative for chills and fever.   HENT: Negative for congestion, hearing loss and rhinorrhea.    Eyes: Negative for pain and visual disturbance.   Respiratory: Negative for cough and shortness of breath.    Cardiovascular: Negative for chest pain and palpitations.   Gastrointestinal: Negative for abdominal pain, constipation, diarrhea, nausea and vomiting.   Endocrine:        +night sweats   Genitourinary: Negative for dysuria and hematuria.   Musculoskeletal: Negative for arthralgias and myalgias.   Skin: Negative for rash.   Neurological: Negative for weakness and numbness.   Psychiatric/Behavioral: Positive for sleep disturbance. Negative for dysphoric mood. The patient is nervous/anxious.          Current Outpatient Medications:   •  levonorgestrel-ethinyl estradiol (SEASONALE) 0.15-0.03 MG per tablet, Take 1 tablet by mouth Daily., Disp: 91 tablet, Rfl:  "3  •  linaclotide (LINZESS) 72 MCG capsule capsule, Take 1 capsule by mouth Every Morning Before Breakfast. (Patient taking differently: Take 72 mcg by mouth As Needed.), Disp: 90 capsule, Rfl: 1    Allergies:  Patient has no known allergies.      Objective   Vital Signs:   Vitals:    01/18/23 1133   BP: 129/88   BP Location: Left arm   Patient Position: Sitting   Pulse: 81   Weight: 94.3 kg (208 lb)   Height: 167.6 cm (65.98\")     Physical Exam  Vitals reviewed.   Constitutional:       General: She is not in acute distress.     Appearance: Normal appearance. She is well-developed.   HENT:      Head: Normocephalic and atraumatic.      Right Ear: External ear normal.      Left Ear: External ear normal.      Mouth/Throat:      Mouth: Mucous membranes are moist.   Eyes:      Extraocular Movements: Extraocular movements intact.      Conjunctiva/sclera: Conjunctivae normal.      Pupils: Pupils are equal, round, and reactive to light.   Cardiovascular:      Rate and Rhythm: Normal rate and regular rhythm.      Heart sounds: No murmur heard.  Pulmonary:      Effort: Pulmonary effort is normal.      Breath sounds: Normal breath sounds. No wheezing, rhonchi or rales.   Abdominal:      General: Bowel sounds are normal. There is no distension.      Palpations: Abdomen is soft.      Tenderness: There is no abdominal tenderness.   Musculoskeletal:         General: Normal range of motion.   Skin:     General: Skin is warm and dry.   Neurological:      Mental Status: She is alert and oriented to person, place, and time.      Deep Tendon Reflexes: Reflexes normal.   Psychiatric:         Mood and Affect: Mood and affect normal.         Behavior: Behavior normal.         Thought Content: Thought content normal.         Judgment: Judgment normal.             Assessment and Plan    Diagnoses and all orders for this visit:    1. Annual physical exam (Primary)  Assessment & Plan:  She is UTD on pap smear, last done 12/2021 and this " showed NIL with negative HR-HPV.  Five year repeat recommended.  She is UTD on COVID (due for bivalent booster - declines), Tdap (9/2020), and flu (9/2022).  She is due for routine labs including CMP and lipids; ordered.    Orders:  -     Lipid Panel; Future    2. Other fatigue  Assessment & Plan:  Chronic fatigue, worsened since her COVID infection in [facility].  Checking labs today below to look for abnormalities.  If no abnormalities, we will plan to give her 3 months to see if fatigue improves post-COVID.  At the end of that time, we may need to consider sending her for sleep study to look for sleep apnea.    Orders:  -     CBC & Differential; Future  -     Comprehensive Metabolic Panel; Future  -     TSH; Future  -     Vitamin B12 & Folate; Future    3. Recurrent major depressive disorder, in full remission (HCC)  Overview:  Not currently on medications.  Continue to monitor.      4. History of COVID-19    5. Menorrhagia with regular cycle  -     levonorgestrel-ethinyl estradiol (SEASONALE) 0.15-0.03 MG per tablet; Take 1 tablet by mouth Daily.  Dispense: 91 tablet; Refill: 3    6. Vitamin D deficiency  -     Vitamin D,25-Hydroxy; Future      Follow Up   Return in about 3 months (around 4/18/2023) for 3 month f/u fatigue AND Annual physical in 1 yr pear.  Patient was given instructions and counseling regarding her condition or for health maintenance advice. Please see specific information pulled into the AVS if appropriate.         01/18/2023

## 2023-01-19 ENCOUNTER — LAB (OUTPATIENT)
Dept: LAB | Facility: HOSPITAL | Age: 38
End: 2023-01-19
Payer: COMMERCIAL

## 2023-01-19 DIAGNOSIS — Z00.00 ANNUAL PHYSICAL EXAM: ICD-10-CM

## 2023-01-19 DIAGNOSIS — E55.9 VITAMIN D DEFICIENCY: ICD-10-CM

## 2023-01-19 DIAGNOSIS — R53.83 OTHER FATIGUE: ICD-10-CM

## 2023-01-19 LAB
25(OH)D3 SERPL-MCNC: 25.7 NG/ML (ref 30–100)
ALBUMIN SERPL-MCNC: 4.4 G/DL (ref 3.5–5.2)
ALBUMIN/GLOB SERPL: 1.8 G/DL
ALP SERPL-CCNC: 70 U/L (ref 39–117)
ALT SERPL W P-5'-P-CCNC: 21 U/L (ref 1–33)
ANION GAP SERPL CALCULATED.3IONS-SCNC: 8 MMOL/L (ref 5–15)
AST SERPL-CCNC: 23 U/L (ref 1–32)
BASOPHILS # BLD AUTO: 0.06 10*3/MM3 (ref 0–0.2)
BASOPHILS NFR BLD AUTO: 0.8 % (ref 0–1.5)
BILIRUB SERPL-MCNC: 0.3 MG/DL (ref 0–1.2)
BUN SERPL-MCNC: 16 MG/DL (ref 6–20)
BUN/CREAT SERPL: 21.3 (ref 7–25)
CALCIUM SPEC-SCNC: 9.3 MG/DL (ref 8.6–10.5)
CHLORIDE SERPL-SCNC: 106 MMOL/L (ref 98–107)
CHOLEST SERPL-MCNC: 172 MG/DL (ref 0–200)
CO2 SERPL-SCNC: 23 MMOL/L (ref 22–29)
CREAT SERPL-MCNC: 0.75 MG/DL (ref 0.57–1)
DEPRECATED RDW RBC AUTO: 39 FL (ref 37–54)
EGFRCR SERPLBLD CKD-EPI 2021: 105.3 ML/MIN/1.73
EOSINOPHIL # BLD AUTO: 0.61 10*3/MM3 (ref 0–0.4)
EOSINOPHIL NFR BLD AUTO: 7.8 % (ref 0.3–6.2)
ERYTHROCYTE [DISTWIDTH] IN BLOOD BY AUTOMATED COUNT: 13.4 % (ref 12.3–15.4)
FOLATE SERPL-MCNC: 16.1 NG/ML (ref 4.78–24.2)
GLOBULIN UR ELPH-MCNC: 2.5 GM/DL
GLUCOSE SERPL-MCNC: 81 MG/DL (ref 65–99)
HCT VFR BLD AUTO: 39.8 % (ref 34–46.6)
HDLC SERPL-MCNC: 38 MG/DL (ref 40–60)
HGB BLD-MCNC: 13.3 G/DL (ref 12–15.9)
IMM GRANULOCYTES # BLD AUTO: 0.01 10*3/MM3 (ref 0–0.05)
IMM GRANULOCYTES NFR BLD AUTO: 0.1 % (ref 0–0.5)
LDLC SERPL CALC-MCNC: 117 MG/DL (ref 0–100)
LDLC/HDLC SERPL: 3.06 {RATIO}
LYMPHOCYTES # BLD AUTO: 2.47 10*3/MM3 (ref 0.7–3.1)
LYMPHOCYTES NFR BLD AUTO: 31.7 % (ref 19.6–45.3)
MCH RBC QN AUTO: 26.5 PG (ref 26.6–33)
MCHC RBC AUTO-ENTMCNC: 33.4 G/DL (ref 31.5–35.7)
MCV RBC AUTO: 79.4 FL (ref 79–97)
MONOCYTES # BLD AUTO: 0.46 10*3/MM3 (ref 0.1–0.9)
MONOCYTES NFR BLD AUTO: 5.9 % (ref 5–12)
NEUTROPHILS NFR BLD AUTO: 4.18 10*3/MM3 (ref 1.7–7)
NEUTROPHILS NFR BLD AUTO: 53.7 % (ref 42.7–76)
PLATELET # BLD AUTO: 469 10*3/MM3 (ref 140–450)
PMV BLD AUTO: 8.9 FL (ref 6–12)
POTASSIUM SERPL-SCNC: 4.1 MMOL/L (ref 3.5–5.2)
PROT SERPL-MCNC: 6.9 G/DL (ref 6–8.5)
RBC # BLD AUTO: 5.01 10*6/MM3 (ref 3.77–5.28)
SODIUM SERPL-SCNC: 137 MMOL/L (ref 136–145)
TRIGL SERPL-MCNC: 89 MG/DL (ref 0–150)
TSH SERPL DL<=0.05 MIU/L-ACNC: 1.44 UIU/ML (ref 0.27–4.2)
VIT B12 BLD-MCNC: 433 PG/ML (ref 211–946)
VLDLC SERPL-MCNC: 17 MG/DL (ref 5–40)
WBC NRBC COR # BLD: 7.79 10*3/MM3 (ref 3.4–10.8)

## 2023-01-19 PROCEDURE — 80050 GENERAL HEALTH PANEL: CPT

## 2023-01-19 PROCEDURE — 82746 ASSAY OF FOLIC ACID SERUM: CPT

## 2023-01-19 PROCEDURE — 82607 VITAMIN B-12: CPT

## 2023-01-19 PROCEDURE — 36415 COLL VENOUS BLD VENIPUNCTURE: CPT

## 2023-01-19 PROCEDURE — 82306 VITAMIN D 25 HYDROXY: CPT

## 2023-01-19 PROCEDURE — 80061 LIPID PANEL: CPT

## 2023-01-20 RX ORDER — ERGOCALCIFEROL 1.25 MG/1
50000 CAPSULE ORAL
Qty: 12 CAPSULE | Refills: 1 | Status: SHIPPED | OUTPATIENT
Start: 2023-01-20

## 2023-04-20 ENCOUNTER — TELEPHONE (OUTPATIENT)
Dept: FAMILY MEDICINE CLINIC | Age: 38
End: 2023-04-20
Payer: COMMERCIAL

## 2023-04-20 DIAGNOSIS — E55.9 VITAMIN D DEFICIENCY: Primary | ICD-10-CM

## 2023-04-20 NOTE — TELEPHONE ENCOUNTER
----- Message from Jessica Abarca MD sent at 1/20/2023 11:08 AM EST -----  Please call pt and let  know it is time to recheck a vitamin D level.  Please pend order to me, dx vit D deficiency.  Thanks, TONY

## 2023-04-25 ENCOUNTER — TELEPHONE (OUTPATIENT)
Dept: FAMILY MEDICINE CLINIC | Age: 38
End: 2023-04-25
Payer: COMMERCIAL

## 2023-04-25 ENCOUNTER — TELEPHONE (OUTPATIENT)
Dept: SURGERY | Facility: CLINIC | Age: 38
End: 2023-04-25
Payer: COMMERCIAL

## 2023-04-25 NOTE — TELEPHONE ENCOUNTER
A few days ago, Joelle started to notice a pea-sized, tender round mass and the base of the rectum.  After about a day, she noted that it became hard to touch and at that time, became severely tender.  It does not bother her much when she is moving around during the day, but at night, it is even waking her from sleep.  No rectal bleeding.  She started conservative treatment at home presuming that it was a thrombosed hemorrhoid including Epsom salt baths, witch hazel tucks and OTC steroid cream, but has not gotten any significant relief.  She asked me to take a look at it today to confirm the diagnosis, and I did so in an exam room for privacy.  Chaperone was offered and declined.  It does appear to be a large, tender, inflamed external hemorrhoid involving the right side of the rectum from 7 o'clock to 10 o'clock.  Thrombosis is likely.  She may continue with Sitz baths and other conservative measures, but her best bet for quick relief would be excision of the thombus.  I do not perform this procedure, but she can check with the other docs in the practice, or alternatively, I can provide her with a referral to see Dr. Olivia (or another general surgeon of her choosing) for definitive management.  Thanks, TONY

## 2023-04-25 NOTE — TELEPHONE ENCOUNTER
Per dr huizar he can do this in [place] but cannot do it until 5/9. He will be out of town next week and we wont be back to [place] until 5/9

## 2023-04-25 NOTE — TELEPHONE ENCOUNTER
Pt has a thrombosed hemorrhoid. She works in the [employer] office. She is asking if she could get this lanced in [employer].

## 2023-05-01 ENCOUNTER — TELEPHONE (OUTPATIENT)
Dept: FAMILY MEDICINE CLINIC | Age: 38
End: 2023-05-01
Payer: COMMERCIAL

## 2023-05-12 ENCOUNTER — OFFICE VISIT (OUTPATIENT)
Dept: FAMILY MEDICINE CLINIC | Age: 38
End: 2023-05-12
Payer: COMMERCIAL

## 2023-05-12 VITALS
HEIGHT: 66 IN | HEART RATE: 85 BPM | SYSTOLIC BLOOD PRESSURE: 123 MMHG | WEIGHT: 204 LBS | DIASTOLIC BLOOD PRESSURE: 80 MMHG | TEMPERATURE: 98.3 F | BODY MASS INDEX: 32.78 KG/M2 | OXYGEN SATURATION: 99 %

## 2023-05-12 DIAGNOSIS — J01.40 SUBACUTE PANSINUSITIS: Primary | ICD-10-CM

## 2023-05-12 PROCEDURE — 99213 OFFICE O/P EST LOW 20 MIN: CPT

## 2023-05-12 RX ORDER — METHYLPREDNISOLONE 4 MG/1
TABLET ORAL
Qty: 21 TABLET | Refills: 0 | Status: SHIPPED | OUTPATIENT
Start: 2023-05-12

## 2023-05-12 RX ORDER — AMOXICILLIN AND CLAVULANATE POTASSIUM 875; 125 MG/1; MG/1
1 TABLET, FILM COATED ORAL 2 TIMES DAILY
Qty: 20 TABLET | Refills: 0 | Status: SHIPPED | OUTPATIENT
Start: 2023-05-12 | End: 2023-05-22

## 2023-05-12 NOTE — PROGRESS NOTES
Subjective     CHIEF COMPLAINT    Chief Complaint   Patient presents with   • Sinus Problem     Since end of March 2023      History of Present Illness  Patient is a 37-year-old female who presents to the clinic today due to sinus issues.  She reports that at the end of March she started having pressure in her sinus area. This was after she had the stomach virus where she felt she had some vomit come out of her nose. She does not feel like she is having any nasal congestion but states that all the pressure is located in her sinus areas, behind her eyes and in her forehead.  She also feels like she has some fluid on her ears.  She thinks that she has had some postnasal drip.  She does not feel like her symptoms are worsening but they have not improved at all.  She also notes that her teeth hurt sometimes and she feels more pain and pressure when she bends over.  She denies any fever or chills.  Denies any shortness of breath, wheezing or chest pain.  She has been using Flonase nasal spray every other day and saline nasal spray daily. She has also tried a neti pot.       Review of Systems   Constitutional: Positive for fatigue. Negative for chills and fever.   HENT: Positive for postnasal drip, sinus pressure and sinus pain.    Respiratory: Negative for cough, shortness of breath and wheezing.    Cardiovascular: Negative for chest pain.   Neurological: Positive for headaches.     No Known Allergies    Current Outpatient Medications on File Prior to Visit   Medication Sig Dispense Refill   • levonorgestrel-ethinyl estradiol (SEASONALE) 0.15-0.03 MG per tablet Take 1 tablet by mouth Daily. 91 tablet 3   • linaclotide (LINZESS) 72 MCG capsule capsule Take 1 capsule by mouth Every Morning Before Breakfast. (Patient taking differently: Take 1 capsule by mouth As Needed.) 90 capsule 1   • vitamin D (ERGOCALCIFEROL) 1.25 MG (65603 UT) capsule capsule Take 1 capsule by mouth Every 7 Days. 12 capsule 1     No current  "facility-administered medications on file prior to visit.     /80 (BP Location: Left arm, Patient Position: Sitting, Cuff Size: Adult)   Pulse 85   Temp 98.3 °F (36.8 °C) (Oral)   Ht 167.6 cm (65.98\")   Wt 92.5 kg (204 lb)   SpO2 99%   BMI 32.94 kg/m²     Objective     Physical Exam  Vitals and nursing note reviewed.   Constitutional:       General: She is not in acute distress.     Appearance: Normal appearance. She is not ill-appearing.   HENT:      Head: Normocephalic.      Right Ear: Tympanic membrane, ear canal and external ear normal.      Left Ear: Tympanic membrane, ear canal and external ear normal.      Nose:      Right Sinus: Maxillary sinus tenderness and frontal sinus tenderness present.      Left Sinus: Maxillary sinus tenderness and frontal sinus tenderness present.      Mouth/Throat:      Lips: Pink.      Mouth: Mucous membranes are moist.      Pharynx: Oropharynx is clear. Uvula midline. No pharyngeal swelling, oropharyngeal exudate, posterior oropharyngeal erythema or uvula swelling.   Eyes:      Pupils: Pupils are equal, round, and reactive to light.   Cardiovascular:      Rate and Rhythm: Normal rate and regular rhythm.      Heart sounds: Normal heart sounds. No murmur heard.  Pulmonary:      Effort: Pulmonary effort is normal. No accessory muscle usage or respiratory distress.      Breath sounds: Normal breath sounds. No wheezing or rhonchi.   Lymphadenopathy:      Cervical: No cervical adenopathy.   Skin:     General: Skin is warm and dry.   Neurological:      General: No focal deficit present.      Mental Status: She is alert and oriented to person, place, and time.   Psychiatric:         Mood and Affect: Mood and affect normal.         Behavior: Behavior normal.          Diagnoses and all orders for this visit:    1. Subacute pansinusitis (Primary)  -     amoxicillin-clavulanate (Augmentin) 875-125 MG per tablet; Take 1 tablet by mouth 2 (Two) Times a Day for 10 days.  Dispense: " 20 tablet; Refill: 0    Other orders  -     methylPREDNISolone (MEDROL) 4 MG dose pack; Take as directed on package instructions.  Dispense: 21 tablet; Refill: 0    Given the duration of her symptoms, I think it is reasonable to trial antibiotics and steroids today to help decrease the inflammation and address any potential bacterial sinusitis that is present. We did discuss a sinus x ray or referral to ENT, however, we will trial the above measures first and consider referral/xray should her symptoms persist. She was advised to continue the flonase, saline nasal spray. Can consider adding in an antihistamine to her regimen. Return to clinic if symptoms worsen or do not improve. Patient voiced understanding of all instructions and had no further questions at this time.      Follow up:  Return if symptoms worsen or fail to improve.  Patient was given instructions and counseling regarding her condition or for health maintenance advice. Please see specific information pulled into the AVS if appropriate.

## 2023-07-25 ENCOUNTER — TREATMENT (OUTPATIENT)
Dept: PHYSICAL THERAPY | Facility: CLINIC | Age: 38
End: 2023-07-25
Payer: COMMERCIAL

## 2023-07-25 DIAGNOSIS — M25.512 ACUTE PAIN OF LEFT SHOULDER: ICD-10-CM

## 2023-07-25 DIAGNOSIS — M54.2 PAIN, NECK: Primary | ICD-10-CM

## 2023-07-25 DIAGNOSIS — R68.84 JAW PAIN: ICD-10-CM

## 2023-08-04 ENCOUNTER — TREATMENT (OUTPATIENT)
Dept: PHYSICAL THERAPY | Facility: CLINIC | Age: 38
End: 2023-08-04

## 2023-08-04 DIAGNOSIS — M25.512 ACUTE PAIN OF LEFT SHOULDER: ICD-10-CM

## 2023-08-04 DIAGNOSIS — M54.2 PAIN, NECK: Primary | ICD-10-CM

## 2023-08-04 DIAGNOSIS — R68.84 JAW PAIN: ICD-10-CM

## 2023-08-09 ENCOUNTER — OFFICE VISIT (OUTPATIENT)
Dept: FAMILY MEDICINE CLINIC | Age: 38
End: 2023-08-09
Payer: COMMERCIAL

## 2023-08-09 VITALS
BODY MASS INDEX: 31.34 KG/M2 | WEIGHT: 195 LBS | TEMPERATURE: 98.2 F | SYSTOLIC BLOOD PRESSURE: 119 MMHG | HEART RATE: 84 BPM | HEIGHT: 66 IN | DIASTOLIC BLOOD PRESSURE: 79 MMHG

## 2023-08-09 DIAGNOSIS — M54.12 CERVICAL RADICULOPATHY: ICD-10-CM

## 2023-08-09 DIAGNOSIS — M54.2 CERVICALGIA: Primary | ICD-10-CM

## 2023-08-09 PROBLEM — Z00.00 ANNUAL PHYSICAL EXAM: Status: RESOLVED | Noted: 2021-09-28 | Resolved: 2023-08-09

## 2023-08-09 PROCEDURE — 99213 OFFICE O/P EST LOW 20 MIN: CPT | Performed by: FAMILY MEDICINE

## 2023-08-09 RX ORDER — CYCLOBENZAPRINE HCL 10 MG
10 TABLET ORAL NIGHTLY PRN
Qty: 30 TABLET | Refills: 1 | Status: SHIPPED | OUTPATIENT
Start: 2023-08-09

## 2023-08-09 NOTE — PROGRESS NOTES
Chief Complaint  Neck Pain    Subjective          Joelle Clement presents to [facility] today for a 6-week history of cervicalgia.  She describes tight pain in the neck and shoulders.  She can feel knots in her neck at times.  The pain is constant and occasionally radiates down the left arm, associated with paresthesias, especially if she presses on the knots in the neck.  She is unable to lay supine as this causes the pressure to radiate up into her head.  She has complained of headaches which she feels behind the left eye and in the temporal area.  This headache is pressure-like and waxes and wanes throughout the day but is there on a daily basis.  The pain intensifies in the neck and headache both if she moves her neck very much.  She has tried multiple conservative measures including use of ibuprofen which temporarily knocks the pain back at least a little bit but does not resolve the pain.  She has tried both Tylenol and an old prescription of hydrocodone but neither of those have helped the pain at all so she is not currently using them.  She has tried Flexeril at bedtime and that does put her to sleep but other than the sedation, it does not seem to be helping with the pain itself.  She has been alternating ice and heat to the area, again without sustained relief.  She has been evaluated by Physical Therapy where they worked with her including doing dry needling.  However, that actually intensified her pain and the therapist recommended returning to be seen here for additional evaluation.  She has continued doing her home exercise program which again provides temporary, mild relief without resolution of the pain, but the pain always returns at full strength shortly thereafter.  She denies weakness or numbness in the left arm.  No symptoms at all in the right neck or right upper extremity.      Current Outpatient Medications:     cyclobenzaprine (FLEXERIL) 10 MG tablet, Take 1  "tablet by mouth At Night As Needed for Muscle Spasms., Disp: 30 tablet, Rfl: 1    levonorgestrel-ethinyl estradiol (SEASONALE) 0.15-0.03 MG per tablet, Take 1 tablet by mouth Daily., Disp: 91 tablet, Rfl: 3    linaclotide (LINZESS) 145 MCG capsule capsule, Take 1 capsule by mouth Daily As Needed for constipation, Disp: 30 capsule, Rfl: 5    Allergies:  Patient has no known allergies.      Objective   Vital Signs:   Vitals:    08/09/23 1346   BP: 119/79   BP Location: Right arm   Patient Position: Sitting   Pulse: 84   Temp: 98.2 øF (36.8 øC)   TempSrc: Oral   Weight: 88.5 kg (195 lb)   Height: 167.6 cm (65.98\")       Physical Exam  Constitutional:       Appearance: Normal appearance.   HENT:      Head: Normocephalic and atraumatic.   Eyes:      Extraocular Movements: Extraocular movements intact.      Conjunctiva/sclera: Conjunctivae normal.   Pulmonary:      Effort: Pulmonary effort is normal. No respiratory distress.   Musculoskeletal:         General: Tenderness (Left-sided cervical paraspinals, some palpable muscle spasms in the vicinity of the left neck) present. No swelling or signs of injury. Normal range of motion.   Skin:     General: Skin is warm and dry.   Neurological:      General: No focal deficit present.      Mental Status: She is alert and oriented to person, place, and time.      Motor: No weakness.      Deep Tendon Reflexes: Reflexes normal.   Psychiatric:         Mood and Affect: Mood normal.         Behavior: Behavior normal.         Thought Content: Thought content normal.         Judgment: Judgment normal.         Lab Results   Component Value Date    GLUCOSE 81 01/19/2023    BUN 16 01/19/2023    CREATININE 0.75 01/19/2023    EGFRIFNONA 76 09/24/2021    BCR 21.3 01/19/2023    K 4.1 01/19/2023    CO2 23.0 01/19/2023    CALCIUM 9.3 01/19/2023    ALBUMIN 4.4 01/19/2023    LABIL2 1.6 09/08/2020    AST 23 01/19/2023    ALT 21 01/19/2023       Lab Results   Component Value Date    CHOL 172 " 01/19/2023    CHLPL 162 09/08/2020    TRIG 89 01/19/2023    HDL 38 (L) 01/19/2023     (H) 01/19/2023            Assessment and Plan    Diagnoses and all orders for this visit:    1. Cervicalgia (Primary)  Assessment & Plan:  Significant left neck pain occasionally radiating down into the left arm that is impacting her function and quality of life.  She has been trying conservative methods for the past 6 weeks or so without significant benefit including NSAIDs, Tylenol, muscle relaxers, opiates, ice and heat, and Physical Therapy.  At this point, we should move ahead with MRI C-spine without contrast, ordered as below.  I am going to send in some muscle relaxers with Flexeril 10 mg nightly to be used to help her with sleep.  Although it does not seem to help the muscle pain itself, does at least allow her to sleep through the night.  Continue to closely monitor.  Will likely plan to send her to Pain Management at [place] with Dr. Mcintyre for further intervention once the MRI is back, as long as it does not indicate a need for surgery.    Orders:  -     cyclobenzaprine (FLEXERIL) 10 MG tablet; Take 1 tablet by mouth At Night As Needed for Muscle Spasms.  Dispense: 30 tablet; Refill: 1  -     MRI Cervical Spine Without Contrast; Future    2. Cervical radiculopathy  Assessment & Plan:  As per cervicalgia plan.    Orders:  -     MRI Cervical Spine Without Contrast; Future        Follow Up   No follow-ups on file.  Patient was given instructions and counseling regarding her condition or for health maintenance advice. Please see specific information pulled into the AVS if appropriate.           08/09/2023

## 2023-08-09 NOTE — ASSESSMENT & PLAN NOTE
Significant left neck pain occasionally radiating down into the left arm that is impacting her function and quality of life.  She has been trying conservative methods for the past 6 weeks or so without significant benefit including NSAIDs, Tylenol, muscle relaxers, opiates, ice and heat, and Physical Therapy.  At this point, we should move ahead with MRI C-spine without contrast, ordered as below.  I am going to send in some muscle relaxers with Flexeril 10 mg nightly to be used to help her with sleep.  Although it does not seem to help the muscle pain itself, does at least allow her to sleep through the night.  Continue to closely monitor.  Will likely plan to send her to Pain Management at [place] with Dr. Mcintyre for further intervention once the MRI is back, as long as it does not indicate a need for surgery.

## 2023-08-10 ENCOUNTER — HOSPITAL ENCOUNTER (OUTPATIENT)
Dept: MRI IMAGING | Facility: HOSPITAL | Age: 38
Discharge: HOME | End: 2023-08-10
Admitting: FAMILY MEDICINE
Payer: COMMERCIAL

## 2023-08-10 DIAGNOSIS — M54.2 CERVICALGIA: ICD-10-CM

## 2023-08-10 DIAGNOSIS — M54.12 CERVICAL RADICULOPATHY: ICD-10-CM

## 2023-08-10 PROCEDURE — 72141 MRI NECK SPINE W/O DYE: CPT

## 2023-08-18 ENCOUNTER — TELEPHONE (OUTPATIENT)
Dept: PAIN MEDICINE | Facility: CLINIC | Age: 38
End: 2023-08-18
Payer: COMMERCIAL

## 2023-08-21 ENCOUNTER — OFFICE VISIT (OUTPATIENT)
Dept: PAIN MEDICINE | Facility: CLINIC | Age: 38
End: 2023-08-21
Payer: COMMERCIAL

## 2023-08-21 VITALS
HEART RATE: 92 BPM | HEIGHT: 66 IN | SYSTOLIC BLOOD PRESSURE: 112 MMHG | OXYGEN SATURATION: 96 % | DIASTOLIC BLOOD PRESSURE: 90 MMHG | WEIGHT: 197.2 LBS | TEMPERATURE: 97.3 F | BODY MASS INDEX: 31.69 KG/M2

## 2023-08-21 DIAGNOSIS — M54.81 OCCIPITAL NEURALGIA OF LEFT SIDE: Primary | ICD-10-CM

## 2023-08-21 DIAGNOSIS — G44.86 CERVICOGENIC HEADACHE: ICD-10-CM

## 2023-08-21 DIAGNOSIS — M54.2 CERVICALGIA: ICD-10-CM

## 2023-08-21 PROCEDURE — 99214 OFFICE O/P EST MOD 30 MIN: CPT | Performed by: NURSE PRACTITIONER

## 2023-08-21 RX ORDER — IBUPROFEN 400 MG/1
400 TABLET ORAL EVERY 6 HOURS PRN
COMMUNITY

## 2023-08-22 ENCOUNTER — TELEPHONE (OUTPATIENT)
Dept: PAIN MEDICINE | Facility: CLINIC | Age: 38
End: 2023-08-22
Payer: COMMERCIAL

## 2023-08-22 NOTE — TELEPHONE ENCOUNTER
Patient currently at work will call back to schedule her in office procedure for a greater occipital nerve block.

## 2023-09-21 ENCOUNTER — TELEPHONE (OUTPATIENT)
Dept: NEUROLOGY | Facility: CLINIC | Age: 38
End: 2023-09-21
Payer: COMMERCIAL

## 2023-09-21 NOTE — TELEPHONE ENCOUNTER
PT IS A WORKER HERE AT THE OFFICE DOWNSTAIRS.     SHE HAS SPOKEN WITH DR. BLAS IN THE PAST ABOUT SOME OF HER ISSUES.    SHE HAS SINCE SEEN PAIN MGMT. THEY ADVISED PT SHE WOULD BE A CANDIDATE FOR AN ONB.     SPOKE WITH DR. BLAS HE IS OK WITH SEEING PT AND DOING ONB HERE AT THE OFFICE. TO HELP WITH THE COST OF HER CO-PAY HE IS OK DOING THIS SAME DAY AND HER EST'ING CARE.     ALREADY DID AUTH ON ONB AND GOT RESPONSE, NO AUTHORIZATION REQUIRED.     -CLR

## 2023-09-22 ENCOUNTER — OFFICE VISIT (OUTPATIENT)
Dept: NEUROLOGY | Facility: CLINIC | Age: 38
End: 2023-09-22
Payer: COMMERCIAL

## 2023-09-22 VITALS
HEIGHT: 66 IN | OXYGEN SATURATION: 98 % | WEIGHT: 195 LBS | SYSTOLIC BLOOD PRESSURE: 103 MMHG | BODY MASS INDEX: 31.34 KG/M2 | HEART RATE: 74 BPM | DIASTOLIC BLOOD PRESSURE: 72 MMHG

## 2023-09-22 DIAGNOSIS — M54.81 OCCIPITAL NEURALGIA OF LEFT SIDE: Primary | ICD-10-CM

## 2023-09-22 RX ORDER — PREGABALIN 75 MG/1
75 CAPSULE ORAL 2 TIMES DAILY
Qty: 90 CAPSULE | Refills: 2 | Status: SHIPPED | OUTPATIENT
Start: 2023-09-22 | End: 2024-09-21

## 2023-09-22 NOTE — PROGRESS NOTES
This a very pleasant 37-year-old female who I am seeing in initial consultation today for headaches.  She reports these started several months ago when she noticed every time she would lay her head down she would have pain at the back of her head.  She reports that it was also painful to touch mostly on the left side of her head.  She denies any light sensitivity no noise sensitivity no focal neurologic deficits with this headache.  She describes the pain is burning and tingling on the left backside of her head.        Past medical history    Reflux    Radiculopathy        Family history    Noncontributory        Social history    No alcohol tobacco or illicit drug use she works as a nurse here at [employer]        On examination today she is alert and oriented x3.  Her speech is fluent there is no dysarthria no aphasia.  Cranial nerves II through XII are intact.  She has some occipital tenderness in the left occipital nerve distribution no temporal tenderness she has 5 out of 5 strength in bilateral upper and lower extremities with normal tone and bulk her reflexes are 2+ throughout there are no upper motor neuron signs no ataxia finger-nose or heel-to-shin sensations intact throughout.  On direct ophthalmologically examination I do not see any obvious disc edema.        I reviewed her most recent MRI of the cervical cord which shows no major abnormalities.  Does have some mild degenerative disc disease.        In summary this a very pleasant 37-year-old female with occipital neuralgia.  I am going to start her on Lyrica 75 mg p.o. twice daily I reviewed the risks and benefits of this medication with her at length.  I am also going to set her up for an occipital nerve block.        I obtained informed consent and did an occipital nerve block on both the left and the right side she had some specific tenderness in the area of the greater occipital nerve and I did 2 injections around this sensitive site.  I did standard  dosing of bupivacaine and lidocaine in the standard injection sites for occipital neuralgia.  She tolerated the procedure with no major issues.        I spent 40 minutes in patient care.

## 2023-09-25 NOTE — PROGRESS NOTES
Nerve Block    Date/Time: 9/22/2023 11:15 AM  Performed by: Jam Gutierrez DO  Authorized by: Jam Gutierrez DO   Indications: pain relief  Body area: head  Nerve: occipital  Laterality: left    Sedation:  Patient sedated: no    Patient position: sitting  Needle size: 18 G  Location technique: anatomical landmarks  Local Anesthetic: lidocaine 1% with epinephrine and bupivacaine 0.25% without epinephrine  Outcome: pain unchanged  Patient tolerance: patient tolerated the procedure well with no immediate complications      Nerve Block    Date/Time: 9/25/2023 3:39 PM  Performed by: Jam Gutierrez DO  Authorized by: Jam Gutierrez DO   Indications: pain relief  Body area: head  Nerve: occipital  Laterality: right    Sedation:  Patient sedated: no    Patient position: sitting  Needle size: 18 G  Location technique: anatomical landmarks  Local Anesthetic: lidocaine 1% with epinephrine and bupivacaine 0.25% without epinephrine  Outcome: pain unchanged  Patient tolerance: patient tolerated the procedure well with no immediate complications

## 2023-10-25 ENCOUNTER — LAB (OUTPATIENT)
Dept: LAB | Facility: HOSPITAL | Age: 38
End: 2023-10-25
Payer: COMMERCIAL

## 2023-10-25 ENCOUNTER — OFFICE VISIT (OUTPATIENT)
Dept: FAMILY MEDICINE CLINIC | Age: 38
End: 2023-10-25
Payer: COMMERCIAL

## 2023-10-25 VITALS
WEIGHT: 190 LBS | DIASTOLIC BLOOD PRESSURE: 65 MMHG | BODY MASS INDEX: 30.53 KG/M2 | HEIGHT: 66 IN | HEART RATE: 73 BPM | TEMPERATURE: 98.3 F | SYSTOLIC BLOOD PRESSURE: 91 MMHG

## 2023-10-25 DIAGNOSIS — E55.9 VITAMIN D DEFICIENCY: ICD-10-CM

## 2023-10-25 DIAGNOSIS — R10.10 UPPER ABDOMINAL PAIN: ICD-10-CM

## 2023-10-25 DIAGNOSIS — R10.10 UPPER ABDOMINAL PAIN: Primary | ICD-10-CM

## 2023-10-25 PROBLEM — Z86.16 HISTORY OF COVID-19: Status: RESOLVED | Noted: 2023-01-18 | Resolved: 2023-10-25

## 2023-10-25 LAB
BASOPHILS # BLD AUTO: 0.06 10*3/MM3 (ref 0–0.2)
BASOPHILS NFR BLD AUTO: 0.7 % (ref 0–1.5)
DEPRECATED RDW RBC AUTO: 38.7 FL (ref 37–54)
EOSINOPHIL # BLD AUTO: 1.22 10*3/MM3 (ref 0–0.4)
EOSINOPHIL NFR BLD AUTO: 13.7 % (ref 0.3–6.2)
ERYTHROCYTE [DISTWIDTH] IN BLOOD BY AUTOMATED COUNT: 12.7 % (ref 12.3–15.4)
HCT VFR BLD AUTO: 44.3 % (ref 34–46.6)
HGB BLD-MCNC: 14.9 G/DL (ref 12–15.9)
IMM GRANULOCYTES # BLD AUTO: 0.01 10*3/MM3 (ref 0–0.05)
IMM GRANULOCYTES NFR BLD AUTO: 0.1 % (ref 0–0.5)
LYMPHOCYTES # BLD AUTO: 2.11 10*3/MM3 (ref 0.7–3.1)
LYMPHOCYTES NFR BLD AUTO: 23.6 % (ref 19.6–45.3)
MCH RBC QN AUTO: 28.3 PG (ref 26.6–33)
MCHC RBC AUTO-ENTMCNC: 33.6 G/DL (ref 31.5–35.7)
MCV RBC AUTO: 84.1 FL (ref 79–97)
MONOCYTES # BLD AUTO: 0.5 10*3/MM3 (ref 0.1–0.9)
MONOCYTES NFR BLD AUTO: 5.6 % (ref 5–12)
NEUTROPHILS NFR BLD AUTO: 5.03 10*3/MM3 (ref 1.7–7)
NEUTROPHILS NFR BLD AUTO: 56.3 % (ref 42.7–76)
PLATELET # BLD AUTO: 410 10*3/MM3 (ref 140–450)
PMV BLD AUTO: 8.2 FL (ref 6–12)
RBC # BLD AUTO: 5.27 10*6/MM3 (ref 3.77–5.28)
WBC NRBC COR # BLD: 8.93 10*3/MM3 (ref 3.4–10.8)

## 2023-10-25 PROCEDURE — 85025 COMPLETE CBC W/AUTO DIFF WBC: CPT

## 2023-10-25 PROCEDURE — 99214 OFFICE O/P EST MOD 30 MIN: CPT | Performed by: FAMILY MEDICINE

## 2023-10-25 PROCEDURE — 83013 H PYLORI (C-13) BREATH: CPT

## 2023-10-25 PROCEDURE — 83690 ASSAY OF LIPASE: CPT

## 2023-10-25 PROCEDURE — 80053 COMPREHEN METABOLIC PANEL: CPT

## 2023-10-25 PROCEDURE — 36415 COLL VENOUS BLD VENIPUNCTURE: CPT

## 2023-10-25 PROCEDURE — 82306 VITAMIN D 25 HYDROXY: CPT

## 2023-10-25 NOTE — ASSESSMENT & PLAN NOTE
Joelle is complaining of loss of appetite (down 18 pounds) since her cholecystectomy in the spring of this year by Dr. Olivia for severe cholecystitis.  She has now had onset of upper (R=L) abdominal pain starting consistently about 1-2 hours after eating and lasting for 3-4 hours before gradually subsiding.  Not associated with nausea or changes in bowel movement.  No BRBPR or melena.  She does note that she has had increased hiccups over this timeframe.  Sending her down for abdominal labs.  I have concern for possible inflammatory process in the upper abdomen (differential including pancreatitis, gastritis or a bile acid process).

## 2023-10-25 NOTE — PROGRESS NOTES
"Chief Complaint  Abdominal Pain    Subjective          Joelle Clement presents to [facility] today for abdominal pian.    She has had intermittent loss of appetite since her cholecystectomy.  (She is down 18 lbs since her cholecystectomy.)  Starting last Friday, she started having abdominal pain about 1-2 hours after she eats.  She feels pain in the upper back as well, \"like trapped air.\"  Since Friday, the pain hits every time after she eats.  Pain lasts 3-4 hours and then subsides.  No nausea or vomiting.  She has baseline constipation; no change from this.  She has noticed increased hiccups.  No blood in the stool or melena.      Current Outpatient Medications:     cyclobenzaprine (FLEXERIL) 10 MG tablet, Take 1 tablet by mouth At Night As Needed for Muscle Spasms., Disp: 30 tablet, Rfl: 1    ibuprofen (ADVIL,MOTRIN) 400 MG tablet, Take 1 tablet by mouth Every 6 (Six) Hours As Needed for Mild Pain., Disp: , Rfl:     linaclotide (LINZESS) 145 MCG capsule capsule, Take 1 capsule by mouth Daily As Needed for constipation, Disp: 30 capsule, Rfl: 5  Medications Discontinued During This Encounter   Medication Reason    pregabalin (Lyrica) 75 MG capsule Historical Med - Therapy completed         Allergies:  Patient has no known allergies.      Objective   Vital Signs:   Vitals:    10/25/23 0945   BP: 91/65   BP Location: Left arm   Patient Position: Sitting   Cuff Size: Large Adult   Pulse: 73   Temp: 98.3 °F (36.8 °C)   TempSrc: Oral   Weight: 86.2 kg (190 lb)   Height: 167.6 cm (65.98\")         Physical Exam  Vitals reviewed.   Constitutional:       General: She is not in acute distress.     Appearance: Normal appearance. She is well-developed.   HENT:      Head: Normocephalic and atraumatic.      Right Ear: External ear normal.      Left Ear: External ear normal.   Eyes:      Extraocular Movements: Extraocular movements intact.      Conjunctiva/sclera: Conjunctivae normal.      " Pupils: Pupils are equal, round, and reactive to light.   Cardiovascular:      Rate and Rhythm: Normal rate and regular rhythm.      Heart sounds: No murmur heard.  Pulmonary:      Effort: Pulmonary effort is normal.      Breath sounds: Normal breath sounds. No wheezing, rhonchi or rales.   Abdominal:      General: Bowel sounds are normal. There is no distension.      Palpations: Abdomen is soft. There is no mass.      Tenderness: There is no abdominal tenderness (NO TENDERNESS THROUGHOUT). There is no guarding or rebound.      Hernia: No hernia is present.   Musculoskeletal:         General: Normal range of motion.   Neurological:      Mental Status: She is alert.   Psychiatric:         Mood and Affect: Affect normal.           Lab Results   Component Value Date    GLUCOSE 81 01/19/2023    BUN 16 01/19/2023    CREATININE 0.75 01/19/2023    EGFRIFNONA 76 09/24/2021    BCR 21.3 01/19/2023    K 4.1 01/19/2023    CO2 23.0 01/19/2023    CALCIUM 9.3 01/19/2023    ALBUMIN 4.4 01/19/2023    LABIL2 1.6 09/08/2020    AST 23 01/19/2023    ALT 21 01/19/2023       Lab Results   Component Value Date    CHOL 172 01/19/2023    CHLPL 162 09/08/2020    TRIG 89 01/19/2023    HDL 38 (L) 01/19/2023     (H) 01/19/2023            Assessment and Plan    Diagnoses and all orders for this visit:    1. Upper abdominal pain (Primary)  Assessment & Plan:  Joelle is complaining of loss of appetite (down 18 pounds) since her cholecystectomy in the spring of this year by Dr. Olivia for severe cholecystitis.  She has now had onset of upper (R=L) abdominal pain starting consistently about 1-2 hours after eating and lasting for 3-4 hours before gradually subsiding.  Not associated with nausea or changes in bowel movement.  No BRBPR or melena.  She does note that she has had increased hiccups over this timeframe.  Sending her down for abdominal labs.  I have concern for possible inflammatory process in the upper abdomen (differential including  pancreatitis, gastritis or a bile acid process).      Orders:  -     Comprehensive Metabolic Panel; Future  -     CBC & Differential; Future  -     Lipase; Future  -     H. Pylori Breath Test - Breath, Lung; Future        Follow Up   No follow-ups on file.  Patient was given instructions and counseling regarding her condition or for health maintenance advice. Please see specific information pulled into the AVS if appropriate.           10/25/2023

## 2023-10-26 LAB
25(OH)D3 SERPL-MCNC: 60.5 NG/ML (ref 30–100)
ALBUMIN SERPL-MCNC: 4.7 G/DL (ref 3.5–5.2)
ALBUMIN/GLOB SERPL: 2.2 G/DL
ALP SERPL-CCNC: 82 U/L (ref 39–117)
ALT SERPL W P-5'-P-CCNC: 40 U/L (ref 1–33)
ANION GAP SERPL CALCULATED.3IONS-SCNC: 9 MMOL/L (ref 5–15)
AST SERPL-CCNC: 31 U/L (ref 1–32)
BILIRUB SERPL-MCNC: 0.5 MG/DL (ref 0–1.2)
BUN SERPL-MCNC: 15 MG/DL (ref 6–20)
BUN/CREAT SERPL: 18.1 (ref 7–25)
CALCIUM SPEC-SCNC: 9.9 MG/DL (ref 8.6–10.5)
CHLORIDE SERPL-SCNC: 102 MMOL/L (ref 98–107)
CO2 SERPL-SCNC: 28 MMOL/L (ref 22–29)
CREAT SERPL-MCNC: 0.83 MG/DL (ref 0.57–1)
EGFRCR SERPLBLD CKD-EPI 2021: 92.7 ML/MIN/1.73
GLOBULIN UR ELPH-MCNC: 2.1 GM/DL
GLUCOSE SERPL-MCNC: 78 MG/DL (ref 65–99)
LIPASE SERPL-CCNC: 25 U/L (ref 13–60)
POTASSIUM SERPL-SCNC: 4.7 MMOL/L (ref 3.5–5.2)
PROT SERPL-MCNC: 6.8 G/DL (ref 6–8.5)
SODIUM SERPL-SCNC: 139 MMOL/L (ref 136–145)

## 2023-10-28 LAB — UREA BREATH TEST QL: NEGATIVE

## 2023-10-31 ENCOUNTER — TELEPHONE (OUTPATIENT)
Dept: FAMILY MEDICINE CLINIC | Age: 38
End: 2023-10-31
Payer: COMMERCIAL

## 2023-10-31 ENCOUNTER — HOSPITAL ENCOUNTER (OUTPATIENT)
Dept: GENERAL RADIOLOGY | Facility: HOSPITAL | Age: 38
Discharge: HOME | End: 2023-10-31
Admitting: FAMILY MEDICINE
Payer: COMMERCIAL

## 2023-10-31 DIAGNOSIS — R10.10 UPPER ABDOMINAL PAIN: ICD-10-CM

## 2023-10-31 DIAGNOSIS — K59.04 CHRONIC IDIOPATHIC CONSTIPATION: Primary | ICD-10-CM

## 2023-10-31 DIAGNOSIS — K59.04 CHRONIC IDIOPATHIC CONSTIPATION: ICD-10-CM

## 2023-10-31 PROCEDURE — 74018 RADEX ABDOMEN 1 VIEW: CPT

## 2023-10-31 RX ORDER — LACTULOSE 10 G/15ML
20 SOLUTION ORAL 2 TIMES DAILY PRN
Qty: 473 ML | Refills: 0 | Status: SHIPPED | OUTPATIENT
Start: 2023-10-31

## 2023-10-31 NOTE — TELEPHONE ENCOUNTER
Joelle has not noticed any improvement in her sx.  Additionally, she has had worsening constipation over the past week.  Even Linzess is not helping.  Checking KUB and I discussed options with her of proceeding with referral either to GI or to Dr. Oliiva (whom she has previously seen).  Thanks, BZM

## 2023-10-31 NOTE — TELEPHONE ENCOUNTER
ORTIZ showing mild to moderate constipation.  I am going to send in some lactulose to see if we can get her moving.  MiraLAX has not been effective for her in the past.  Thanks, TONY

## 2023-11-01 ENCOUNTER — TELEPHONE (OUTPATIENT)
Dept: GASTROENTEROLOGY | Facility: CLINIC | Age: 38
End: 2023-11-01
Payer: COMMERCIAL

## 2023-11-01 NOTE — TELEPHONE ENCOUNTER
Hub staff attempted to follow warm transfer process and was unsuccessful     Caller: Joelle Clement    Relationship to patient: Self    Best call back number: [phone]    Patient is needing: PATIENT CALLED; SHE WOULD LIKE TO SEE DR. LAGUNA, MILADY WHITFIELD. PLEASE REVIEW THIS REQUEST AND REACH OUT TO THE PATIENT. CALL BACK # [phone].

## 2023-11-02 NOTE — TELEPHONE ENCOUNTER
Called patient to schedule for a OV and discuss setting up a appt w/Dr. Ott. [facility]

## 2023-11-08 ENCOUNTER — TELEPHONE (OUTPATIENT)
Dept: GASTROENTEROLOGY | Facility: CLINIC | Age: 38
End: 2023-11-08
Payer: COMMERCIAL

## 2023-11-08 NOTE — TELEPHONE ENCOUNTER
Patient called regarding scheduling an appointment with Dr. Ford. I advised her that we are unable to schedule with Dr. Ford at this time since his template is not built. I also discussed our transfer of care process and informed her that this has not yet been determined for Dr. Ford. I did advise the patient that if she needed to schedule an appointment that she could contact the hub and ask to be scheduled with LANDEN Brown who also works at [facility]. I also advised that if she wished to do a transfer of care that she could request a transfer to [facility] or [facility]. I informed her that the receiving provider would need to review records and ultimately approve/deny the transfer request. She verbalized understanding for all of the above information.

## 2023-11-14 ENCOUNTER — OFFICE VISIT (OUTPATIENT)
Dept: GASTROENTEROLOGY | Facility: CLINIC | Age: 38
End: 2023-11-14
Payer: COMMERCIAL

## 2023-11-14 VITALS
DIASTOLIC BLOOD PRESSURE: 73 MMHG | HEART RATE: 68 BPM | WEIGHT: 187 LBS | BODY MASS INDEX: 30.2 KG/M2 | SYSTOLIC BLOOD PRESSURE: 122 MMHG

## 2023-11-14 DIAGNOSIS — R74.8 ELEVATED LIVER ENZYMES: Primary | ICD-10-CM

## 2023-11-14 DIAGNOSIS — R10.11 RUQ PAIN: ICD-10-CM

## 2023-11-14 DIAGNOSIS — R14.0 ABDOMINAL BLOATING: ICD-10-CM

## 2023-11-14 DIAGNOSIS — R63.0 DECREASED APPETITE: ICD-10-CM

## 2023-11-14 DIAGNOSIS — R63.4 WEIGHT LOSS: ICD-10-CM

## 2023-11-14 DIAGNOSIS — K59.04 CHRONIC IDIOPATHIC CONSTIPATION: ICD-10-CM

## 2023-11-14 DIAGNOSIS — R68.81 EARLY SATIETY: ICD-10-CM

## 2023-11-14 RX ORDER — PRUCALOPRIDE 2 MG/1
2 TABLET, FILM COATED ORAL DAILY
Qty: 90 TABLET | Refills: 1 | Status: SHIPPED | OUTPATIENT
Start: 2023-11-14 | End: 2023-11-16

## 2023-11-14 NOTE — PROGRESS NOTES
Patient Name: Joelle Clement   Visit Date: 11/14/2023   Patient ID: [id number]  Provider: LANDEN Puente    Sex: female  Location:  Location Address:  Location Phone: [address]  [place] [address] [phone]    YOB: 1985  Age: 38 y.o.   Primary Care Provider Jessica Abarca MD      Referring Provider: Jessica Abarca,*        Chief Complaint  Constipation, Abdominal Pain (Right side ABD discomfort, dull aching. Pt states having loss of appetite, eating a very small portion per day, some days not eating at all ), and Bloated (With meals)    History of Present Illness    Patient initially presented 3/7/2022 with elevated liver enzymes and right upper quadrant discomfort.  ALT was 1000, . Also with heartburn and, decreased appetite, nausea  EGD was recommended however patient requested to proceed to general surgery regarding gallbladder  liver work-up unremarkable March 2022    Patient had gallbladder removed 3/16/2022 with a liver biopsy performed intraoperatively-liver biopsy showed acute hepatitis, no significant fibrosis, medication related and infectious etiologies possible    US abdomen 3/03/2022 mild hepatomegaly with subtle hyperechogenicity, contracted gallbladder with gallstones, no biliary dilation, no ascites     More recently, liver enzymes were normal July 2022 in January 2023, slight increase in ALT at 40 10/25/23, AST normal at 31    Pt states she felt great after GB removal.   Over the last month, she has had decreased appetite, abd bloating and discomfort after eating that may last for hours.  Feels constipation has worsened since GB removal.  Pt states she may go 2 weeks w/o BM - xray showed mild to moderate stool 10/31/23, took lactulose, and felt much better except mild RUQ discomfort, gnawing.   Pt states w linzess 145 mcg/d she has watery stool, no form. States she cannot tolerate during work and she does not feel it produces a normal BM.    Miralax does not work and mag citrate works at times. Has tried everything OTC.   Denies taking Advil frequently.   Has lost about 33 # since GB removal, states she has lost 15-20# this month with difficulty eating.   Hpylori Breath test negative 10/2023    Past Medical History:   Diagnosis Date    Allergic     seasonal only    Anemia     once as a child    Anxiety     Cholelithiasis     Chronic pain disorder     Depression     Disease of thyroid gland     THYROID NODULE- BX AND CONTINUE TO MONTIOR NODULES    Fatty liver 3/3/22    Per ultrasound, ELEVATED LIVER LEVELS PER PT. PT STATED MONITORED WITH LABS (REPEAT CMP TO BE DONE 3-11-22)    Gallstones     GERD (gastroesophageal reflux disease)     Occasional reflux for about a year    Headache     Headache, tension-type     History of medical problems Chronic Constipation    Joint pain     Tmj    Low back pain     Migraine     Neck pain     TMJ dysfunction March 2023       Past Surgical History:   Procedure Laterality Date    ADENOIDECTOMY      CHOLECYSTECTOMY N/A 03/16/2022    Procedure: LAPAROSCOPIC CHOLECYSTECTOMY;  Surgeon: Daniel Olivia MD;  Location: [facility];  Service: General;  Laterality: N/A;    LIVER BIOPSY N/A 03/16/2022    Procedure: LIVER BIOPSY;  Surgeon: Daniel Olivia MD;  Location: [facility] [place];  Service: General;  Laterality: N/A;    TONSILLECTOMY      US GUIDED FINE NEEDLE ASPIRATION  01/12/2021       No Known Allergies    Family History   Problem Relation Age of Onset    Hyperlipidemia Mother     Thyroid disease Mother     Depression Father     Colon cancer Maternal Uncle         unknown age of onset    Cancer Maternal Uncle         colon cancer    Cancer Paternal Aunt         bone cancer    Diabetes Maternal Grandmother     Cancer Paternal Grandfather         lung cancer    Malig Hyperthermia Neg Hx         Social History     Tobacco Use    Smoking status: Never    Smokeless tobacco: Never   Vaping Use    Vaping Use: Never used    Substance Use Topics    Alcohol use: Never    Drug use: Never       Objective     Vital Signs:   /73 (BP Location: Right arm, Patient Position: Sitting, Cuff Size: Adult)   Pulse 68   Wt 84.8 kg (187 lb)   BMI 30.20 kg/m²       Physical Exam  Constitutional:       General: The patient is not in acute distress.     Appearance: Normal appearance.   HENT:      Head: Normocephalic and atraumatic.      Nose: Nose normal.   Pulmonary:      Effort: Pulmonary effort is normal. No respiratory distress.   Abdominal:      General: Abdomen is flat.      Palpations: Abdomen is soft. There is no mass.      Tenderness: There is no abdominal tenderness. There is no guarding.   Musculoskeletal:      Cervical back: Neck supple.      Right lower leg: No edema.      Left lower leg: No edema.   Skin:     General: Skin is warm and dry.   Neurological:      General: No focal deficit present.      Mental Status: The patient is alert and oriented to person, place, and time.      Gait: Gait normal.   Psychiatric:         Mood and Affect: Mood normal.         Speech: Speech normal.         Behavior: Behavior normal.         Thought Content: Thought content normal.     Result Review :   The following data was reviewed by: LANEDN Puente on 11/14/2023:    CBC w/diff          1/19/2023    07:35 10/25/2023    11:10   CBC w/Diff   WBC 7.79  8.93    RBC 5.01  5.27    Hemoglobin 13.3  14.9    Hematocrit 39.8  44.3    MCV 79.4  84.1    MCH 26.5  28.3    MCHC 33.4  33.6    RDW 13.4  12.7    Platelets 469  410    Neutrophil Rel % 53.7  56.3    Immature Granulocyte Rel % 0.1  0.1    Lymphocyte Rel % 31.7  23.6    Monocyte Rel % 5.9  5.6    Eosinophil Rel % 7.8  13.7    Basophil Rel % 0.8  0.7      CMP          1/19/2023    07:35 10/25/2023    11:10   CMP   Glucose 81  78    BUN 16  15    Creatinine 0.75  0.83    EGFR 105.3  92.7    Sodium 137  139    Potassium 4.1  4.7    Chloride 106  102    Calcium 9.3  9.9    Total Protein  6.9  6.8    Albumin 4.4  4.7    Globulin 2.5  2.1    Total Bilirubin 0.3  0.5    Alkaline Phosphatase 70  82    AST (SGOT) 23  31    ALT (SGPT) 21  40    Albumin/Globulin Ratio 1.8  2.2    BUN/Creatinine Ratio 21.3  18.1    Anion Gap 8.0  9.0        Liver Workup   dsDNA   Date Value Ref Range Status   03/09/2022 Negative Negative Final     Expanded ARSEN Screen   Date Value Ref Range Status   03/09/2022 Negative Negative Final     Smooth Muscle Ab   Date Value Ref Range Status   03/09/2022 6 0 - 19 Units Final     Comment:                      Negative                     0 - 19                   Weak positive               20 - 30                   Moderate to strong positive     >30   Actin Antibodies are found in 52-85% of patients with   autoimmune hepatitis or chronic active hepatitis and   in 22% of patients with primary biliary cirrhosis.     Ceruloplasmin   Date Value Ref Range Status   03/09/2022 42 (H) 19 - 39 mg/dL Final     Ferritin   Date Value Ref Range Status   03/02/2022 97.60 13.00 - 150.00 ng/mL Final     Iron   Date Value Ref Range Status   03/02/2022 87 37 - 145 mcg/dL Final     TIBC   Date Value Ref Range Status   03/02/2022 507 298 - 536 mcg/dL Final     Iron Saturation (TSAT)   Date Value Ref Range Status   03/02/2022 17 (L) 20 - 50 % Final     Transferrin   Date Value Ref Range Status   03/02/2022 340 200 - 360 mg/dL Final     Mitochondrial Ab   Date Value Ref Range Status   03/09/2022 <20.0 0.0 - 20.0 Units Final     Comment:                                     Negative    0.0 - 20.0                                  Equivocal  20.1 - 24.9                                  Positive         >24.9  Mitochondrial (M2) Antibodies are found in 90-96% of  patients with primary biliary cirrhosis.     Protime   Date Value Ref Range Status   04/01/2022 10.5 9.4 - 12.0 Seconds Final     INR   Date Value Ref Range Status   04/01/2022 1.00 (L) 2.00 - 3.00 Final     ALPHA-FETOPROTEIN   Date Value Ref Range  Status   03/09/2022 4.23 0 - 8.3 ng/mL Final     Acute HEP Panel   Hepatitis B Surface Ag   Date Value Ref Range Status   03/03/2022 Non-Reactive Non-Reactive Final     Hep A IgM   Date Value Ref Range Status   03/03/2022 Non-Reactive Non-Reactive Final     Hep B C IgM   Date Value Ref Range Status   03/03/2022 Non-Reactive Non-Reactive Final     Hepatitis C Ab   Date Value Ref Range Status   03/03/2022 Non-Reactive Non-Reactive Final               Assessment and Plan    Diagnoses and all orders for this visit:    1. Elevated liver enzymes (Primary)  -     Hepatic Function Panel; Future  -     Protime-INR; Future    2. RUQ pain  -     Case Request; Standing  -     Implement Anesthesia orders day of procedure.; Standing  -     Obtain informed consent; Standing  -     Verify NPO; Standing  -     Verify bowel prep was successful; Standing  -     Give tap water enema if bowel prep was insufficient; Standing  -     Case Request    3. Early satiety  -     Case Request; Standing  -     Implement Anesthesia orders day of procedure.; Standing  -     Obtain informed consent; Standing  -     Verify NPO; Standing  -     Verify bowel prep was successful; Standing  -     Give tap water enema if bowel prep was insufficient; Standing  -     Case Request    4. Chronic idiopathic constipation  -     Case Request; Standing  -     Implement Anesthesia orders day of procedure.; Standing  -     Obtain informed consent; Standing  -     Verify NPO; Standing  -     Verify bowel prep was successful; Standing  -     Give tap water enema if bowel prep was insufficient; Standing  -     Case Request    5. Weight loss  -     Case Request; Standing  -     Implement Anesthesia orders day of procedure.; Standing  -     Obtain informed consent; Standing  -     Verify NPO; Standing  -     Verify bowel prep was successful; Standing  -     Give tap water enema if bowel prep was insufficient; Standing  -     Case Request    6. Abdominal bloating  -      Case Request; Standing  -     Implement Anesthesia orders day of procedure.; Standing  -     Obtain informed consent; Standing  -     Verify NPO; Standing  -     Verify bowel prep was successful; Standing  -     Give tap water enema if bowel prep was insufficient; Standing  -     Case Request    7. Decreased appetite  -     Case Request; Standing  -     Implement Anesthesia orders day of procedure.; Standing  -     Obtain informed consent; Standing  -     Verify NPO; Standing  -     Verify bowel prep was successful; Standing  -     Give tap water enema if bowel prep was insufficient; Standing  -     Case Request    Other orders  -     Prucalopride Succinate (Motegrity) 2 MG tablet; Take 1 tablet by mouth Daily.  Dispense: 90 tablet; Refill: 1  -     polyethylene glycol (GoLYTELY) 236 g solution; Take per office instructions  Dispense: 4000 mL; Refill: 0              Follow Up   Return for follow up after procedure.  EGD/COLONOSCOPY Surgical Risk and Benefits: Possible risks/complications, benefits, and alternatives to surgical or invasive procedure have been explained to patient and/or legal guardian; risks include bleeding, infection, and perforation. Patient has been evaluated and can tolerate anesthesia and/or sedation. Risks, benefits, and alternatives to anesthesia and sedation have been explained to patient and/or legal guardian.   SOPHIA Kruse/DAVIDE Rodriguez. Advised to let us know if any change in mood  Repeat LFTs  Avoid NSAIDs     Patient was given instructions and counseling regarding her condition or for health maintenance advice. Please see specific information pulled into the AVS if appropriate.

## 2023-11-15 NOTE — PRE-PROCEDURE INSTRUCTIONS
"Instructed on date and arrival time of 1130. Come to entrance \"C\". Must have  over age 18 to drive home.  May have two visitors; however, children under 12 must stay in waiting room.  Discussed clear liquid diet (no red or purple), bowel prep, and NPO.  May take medications as usual except for blood thinners, diabetic medications, and weight loss medications.  Bring list of medications.  Verbalized understanding of instructions given.  Instructed to call for questions or concerns.  "

## 2023-11-16 ENCOUNTER — TELEPHONE (OUTPATIENT)
Dept: GASTROENTEROLOGY | Facility: CLINIC | Age: 38
End: 2023-11-16
Payer: COMMERCIAL

## 2023-11-16 RX ORDER — PLECANATIDE 3 MG/1
3 TABLET ORAL DAILY
Qty: 90 TABLET | Refills: 0 | Status: SHIPPED | OUTPATIENT
Start: 2023-11-16

## 2023-11-16 NOTE — TELEPHONE ENCOUNTER
Please notify patient I have sent in Trulance per insurance requirement  Patient has already failed Linzess  If patient has any problems with Trulance or it does not work, call us back and will try Motegrity again.

## 2023-11-17 ENCOUNTER — ANESTHESIA EVENT (OUTPATIENT)
Dept: GASTROENTEROLOGY | Facility: HOSPITAL | Age: 38
End: 2023-11-17
Payer: COMMERCIAL

## 2023-11-17 NOTE — ANESTHESIA PREPROCEDURE EVALUATION
Anesthesia Evaluation     NPO Solid Status: > 8 hours  NPO Liquid Status: > 4 hours           Airway   Mallampati: II  TM distance: >3 FB  Large neck circumference  Dental - normal exam     Pulmonary - normal exam   Cardiovascular - normal exam  Exercise tolerance: good (4-7 METS)        Neuro/Psych  (+) headaches, psychiatric history Anxiety and Depression  GI/Hepatic/Renal/Endo    (+) GERD, liver disease fatty liver disease, thyroid problem thyroid nodules    Musculoskeletal     (+) neck pain (determined to be postural and musculoskeletal in nature)  Abdominal    Substance History      OB/GYN          Other                    Anesthesia Plan    ASA 2     general   total IV anesthesia    Anesthetic plan, risks, benefits, and alternatives have been provided, discussed and informed consent has been obtained with: patient and mother.  Pre-procedure education provided  Plan discussed with CRNA.    CODE STATUS:

## 2023-11-17 NOTE — TELEPHONE ENCOUNTER
Pt called and left VM requesting a call back regarding medication changes. I attempted to call pt, no answer but left message for pt to return call.

## 2023-11-20 ENCOUNTER — ANESTHESIA (OUTPATIENT)
Dept: GASTROENTEROLOGY | Facility: HOSPITAL | Age: 38
End: 2023-11-20
Payer: COMMERCIAL

## 2023-11-20 ENCOUNTER — HOSPITAL ENCOUNTER (OUTPATIENT)
Facility: HOSPITAL | Age: 38
Setting detail: HOSPITAL OUTPATIENT SURGERY
Discharge: HOME | End: 2023-11-20
Attending: INTERNAL MEDICINE | Admitting: INTERNAL MEDICINE
Payer: COMMERCIAL

## 2023-11-20 VITALS
SYSTOLIC BLOOD PRESSURE: 101 MMHG | TEMPERATURE: 97.5 F | HEART RATE: 75 BPM | RESPIRATION RATE: 11 BRPM | OXYGEN SATURATION: 98 % | BODY MASS INDEX: 29.34 KG/M2 | DIASTOLIC BLOOD PRESSURE: 71 MMHG | WEIGHT: 182.54 LBS | HEIGHT: 66 IN

## 2023-11-20 DIAGNOSIS — R63.0 DECREASED APPETITE: ICD-10-CM

## 2023-11-20 DIAGNOSIS — K59.04 CHRONIC IDIOPATHIC CONSTIPATION: ICD-10-CM

## 2023-11-20 DIAGNOSIS — R10.11 RUQ PAIN: ICD-10-CM

## 2023-11-20 DIAGNOSIS — R68.81 EARLY SATIETY: ICD-10-CM

## 2023-11-20 DIAGNOSIS — R63.4 WEIGHT LOSS: ICD-10-CM

## 2023-11-20 DIAGNOSIS — R14.0 ABDOMINAL BLOATING: ICD-10-CM

## 2023-11-20 PROCEDURE — 43239 EGD BIOPSY SINGLE/MULTIPLE: CPT | Performed by: INTERNAL MEDICINE

## 2023-11-20 PROCEDURE — 25810000003 LACTATED RINGERS PER 1000 ML: Performed by: NURSE ANESTHETIST, CERTIFIED REGISTERED

## 2023-11-20 PROCEDURE — 25810000003 LACTATED RINGERS PER 1000 ML: Performed by: ANESTHESIOLOGY

## 2023-11-20 PROCEDURE — 45378 DIAGNOSTIC COLONOSCOPY: CPT | Performed by: INTERNAL MEDICINE

## 2023-11-20 PROCEDURE — 88305 TISSUE EXAM BY PATHOLOGIST: CPT | Performed by: INTERNAL MEDICINE

## 2023-11-20 PROCEDURE — 25010000002 PROPOFOL 10 MG/ML EMULSION: Performed by: NURSE ANESTHETIST, CERTIFIED REGISTERED

## 2023-11-20 RX ORDER — LIDOCAINE HYDROCHLORIDE 20 MG/ML
INJECTION, SOLUTION EPIDURAL; INFILTRATION; INTRACAUDAL; PERINEURAL AS NEEDED
Status: DISCONTINUED | OUTPATIENT
Start: 2023-11-20 | End: 2023-11-20 | Stop reason: SURG

## 2023-11-20 RX ORDER — SODIUM CHLORIDE, SODIUM LACTATE, POTASSIUM CHLORIDE, CALCIUM CHLORIDE 600; 310; 30; 20 MG/100ML; MG/100ML; MG/100ML; MG/100ML
30 INJECTION, SOLUTION INTRAVENOUS CONTINUOUS
Status: DISCONTINUED | OUTPATIENT
Start: 2023-11-20 | End: 2023-11-20 | Stop reason: HOSPADM

## 2023-11-20 RX ORDER — SODIUM CHLORIDE, SODIUM LACTATE, POTASSIUM CHLORIDE, CALCIUM CHLORIDE 600; 310; 30; 20 MG/100ML; MG/100ML; MG/100ML; MG/100ML
INJECTION, SOLUTION INTRAVENOUS CONTINUOUS PRN
Status: DISCONTINUED | OUTPATIENT
Start: 2023-11-20 | End: 2023-11-20 | Stop reason: SURG

## 2023-11-20 RX ORDER — PROPOFOL 10 MG/ML
VIAL (ML) INTRAVENOUS AS NEEDED
Status: DISCONTINUED | OUTPATIENT
Start: 2023-11-20 | End: 2023-11-20 | Stop reason: SURG

## 2023-11-20 RX ORDER — PANTOPRAZOLE SODIUM 40 MG/1
40 TABLET, DELAYED RELEASE ORAL DAILY
Qty: 30 TABLET | Refills: 5 | Status: SHIPPED | OUTPATIENT
Start: 2023-11-20

## 2023-11-20 RX ADMIN — LIDOCAINE HYDROCHLORIDE 80 MG: 20 INJECTION, SOLUTION EPIDURAL; INFILTRATION; INTRACAUDAL; PERINEURAL at 09:13

## 2023-11-20 RX ADMIN — SODIUM CHLORIDE, POTASSIUM CHLORIDE, SODIUM LACTATE AND CALCIUM CHLORIDE: 600; 310; 30; 20 INJECTION, SOLUTION INTRAVENOUS at 09:03

## 2023-11-20 RX ADMIN — PROPOFOL 80 MG: 10 INJECTION, EMULSION INTRAVENOUS at 09:13

## 2023-11-20 RX ADMIN — PROPOFOL 175 MCG/KG/MIN: 10 INJECTION, EMULSION INTRAVENOUS at 09:13

## 2023-11-20 RX ADMIN — SODIUM CHLORIDE, POTASSIUM CHLORIDE, SODIUM LACTATE AND CALCIUM CHLORIDE 30 ML/HR: 600; 310; 30; 20 INJECTION, SOLUTION INTRAVENOUS at 08:34

## 2023-11-20 NOTE — ANESTHESIA POSTPROCEDURE EVALUATION
Patient: Joelle Clement    Procedure Summary       Date: 11/20/23 Room / Location: [facility] ENDOSCOPY 4 / [facility]    Anesthesia Start: 0911 Anesthesia Stop: 0946    Procedures:       ESOPHAGOGASTRODUODENOSCOPY WITH BIOPSIES      COLONOSCOPY Diagnosis:       RUQ pain      Early satiety      Chronic idiopathic constipation      Weight loss      Abdominal bloating      Decreased appetite      (RUQ pain [R10.11])      (Early satiety [R68.81])      (Chronic idiopathic constipation [K59.04])      (Weight loss [R63.4])      (Abdominal bloating [R14.0])      (Decreased appetite [R63.0])    Surgeons: Donta Ott MD Provider: Isma Abel CRNA    Anesthesia Type: general ASA Status: 2            Anesthesia Type: general    Vitals  Vitals Value Taken Time   /71 11/20/23 1005   Temp 36.4 °C (97.5 °F) 11/20/23 1005   Pulse 75 11/20/23 1005   Resp 11 11/20/23 1005   SpO2 98 % 11/20/23 1005           Post Anesthesia Care and Evaluation    Post-procedure mental status: acceptable.  Pain management: satisfactory to patient    Airway patency: patent  Anesthetic complications: No anesthetic complications    Cardiovascular status: acceptable  Respiratory status: acceptable    Comments: Per chart review

## 2023-11-20 NOTE — H&P
Pre Procedure History & Physical    Chief Complaint:   Right upper quadrant pain, change in bowel habits weight loss    Subjective     HPI:   Right upper quadrant pain, change in bowel habits and weight loss    Past Medical History:   Past Medical History:   Diagnosis Date    Allergic     seasonal only    Anemia     once as a child    Anxiety     Cholelithiasis     Chronic pain disorder     Depression     Disease of thyroid gland     THYROID NODULE- BX AND CONTINUE TO MONTIOR NODULES    Fatty liver 3/3/22    Per ultrasound, ELEVATED LIVER LEVELS PER PT. PT STATED MONITORED WITH LABS (REPEAT CMP TO BE DONE 3-11-22)    Gallstones     GERD (gastroesophageal reflux disease)     Occasional reflux for about a year    Headache     Headache, tension-type     History of medical problems Chronic Constipation    Joint pain     Tmj    Low back pain     Migraine     Neck pain     TMJ dysfunction March 2023       Past Surgical History:  Past Surgical History:   Procedure Laterality Date    ADENOIDECTOMY      CHOLECYSTECTOMY N/A 03/16/2022    Procedure: LAPAROSCOPIC CHOLECYSTECTOMY;  Surgeon: Daniel Olivia MD;  Location: [facility] OR [facility];  Service: General;  Laterality: N/A;    LIVER BIOPSY N/A 03/16/2022    Procedure: LIVER BIOPSY;  Surgeon: Daniel Olivia MD;  Location: [facility];  Service: General;  Laterality: N/A;    TONSILLECTOMY      US GUIDED FINE NEEDLE ASPIRATION  01/12/2021       Family History:  Family History   Problem Relation Age of Onset    Hyperlipidemia Mother     Thyroid disease Mother     Depression Father     Colon cancer Maternal Uncle         unknown age of onset    Cancer Maternal Uncle         colon cancer    Cancer Paternal Aunt         bone cancer    Diabetes Maternal Grandmother     Cancer Paternal Grandfather         lung cancer    Malig Hyperthermia Neg Hx        Social History:   reports that she has never smoked. She has never used smokeless tobacco. She reports that she does not drink  "alcohol and does not use drugs.    Medications:   Medications Prior to Admission   Medication Sig Dispense Refill Last Dose    cyclobenzaprine (FLEXERIL) 10 MG tablet Take 1 tablet by mouth At Night As Needed for Muscle Spasms. (Patient not taking: Reported on 11/14/2023) 30 tablet 1     ibuprofen (ADVIL,MOTRIN) 400 MG tablet Take 1 tablet by mouth Every 6 (Six) Hours As Needed for Mild Pain.       lactulose (CHRONULAC) 10 GM/15ML solution Take 30 mL by mouth 2 (Two) Times a Day As Needed (constipation). 473 mL 0     Plecanatide (Trulance) 3 MG tablet Take 1 tablet by mouth Daily. 90 tablet 0     polyethylene glycol (GoLYTELY) 236 g solution Take as directed per office instructions 4000 mL 0        Allergies:  Patient has no known allergies.        Objective     Blood pressure 109/74, pulse 80, temperature 97 °F (36.1 °C), temperature source Temporal, resp. rate 18, height 167.6 cm (66\"), weight 82.8 kg (182 lb 8.7 oz), SpO2 98%.    Physical Exam   Constitutional: Pt is oriented to person, place, and time and well-developed, well-nourished, and in no distress.   Mouth/Throat: Oropharynx is clear and moist.   Neck: Normal range of motion.   Cardiovascular: Normal rate, regular rhythm and normal heart sounds.    Pulmonary/Chest: Effort normal and breath sounds normal.   Abdominal: Soft. Nontender  Skin: Skin is warm and dry.   Psychiatric: Mood, memory, affect and judgment normal.     Assessment & Plan     Diagnosis:  Right upper quadrant pain, change in bowel habits or weight loss    Anticipated Surgical Procedure:  EGD colonoscopy    The risks, benefits, and alternatives of this procedure have been discussed with the patient or the responsible party- the patient understands and agrees to proceed.            "

## 2023-11-20 NOTE — ANESTHESIA POSTPROCEDURE EVALUATION
Patient: Joelle Clement    Procedure Summary       Date: 11/20/23 Room / Location: [facility] ENDOSCOPY 4 / [facility] ENDOSCOPY    Anesthesia Start: 0911 Anesthesia Stop: 0946    Procedures:       ESOPHAGOGASTRODUODENOSCOPY WITH BIOPSIES      COLONOSCOPY Diagnosis:       RUQ pain      Early satiety      Chronic idiopathic constipation      Weight loss      Abdominal bloating      Decreased appetite      (RUQ pain [R10.11])      (Early satiety [R68.81])      (Chronic idiopathic constipation [K59.04])      (Weight loss [R63.4])      (Abdominal bloating [R14.0])      (Decreased appetite [R63.0])    Surgeons: Donta Ott MD Provider: Isma Abel CRNA    Anesthesia Type: general ASA Status: 2            Anesthesia Type: general    Vitals  Vitals Value Taken Time   /71 11/20/23 1005   Temp 36.4 °C (97.5 °F) 11/20/23 1005   Pulse 75 11/20/23 1005   Resp 11 11/20/23 1005   SpO2 98 % 11/20/23 1005           Post Anesthesia Care and Evaluation    Patient location during evaluation: bedside  Patient participation: complete - patient participated  Level of consciousness: awake  Pain management: adequate    Airway patency: patent  Anesthetic complications: No anesthetic complications  PONV Status: controlled  Cardiovascular status: acceptable and stable  Respiratory status: acceptable

## 2023-11-20 NOTE — NURSING NOTE
PER PT, SHE IS NOT PREGNANT BECAUSE SHE HAS NOT HAD SEX IN 12 YRS. NO URINE OBTAINED. VERIFIED WITH DR. BROWN AND MEGHANN ESCOBAR AND THEY ARE OK TO PROCEED.   Patient

## 2023-11-21 ENCOUNTER — LAB (OUTPATIENT)
Dept: LAB | Facility: HOSPITAL | Age: 38
End: 2023-11-21
Payer: COMMERCIAL

## 2023-11-21 DIAGNOSIS — R63.4 WEIGHT LOSS: ICD-10-CM

## 2023-11-21 DIAGNOSIS — R14.0 ABDOMINAL BLOATING: ICD-10-CM

## 2023-11-21 DIAGNOSIS — R63.4 WEIGHT LOSS: Primary | ICD-10-CM

## 2023-11-21 DIAGNOSIS — R74.8 ELEVATED LIVER ENZYMES: ICD-10-CM

## 2023-11-21 LAB
ALBUMIN SERPL-MCNC: 4.9 G/DL (ref 3.5–5.2)
ALP SERPL-CCNC: 70 U/L (ref 39–117)
ALT SERPL W P-5'-P-CCNC: 22 U/L (ref 1–33)
AST SERPL-CCNC: 21 U/L (ref 1–32)
BILIRUB CONJ SERPL-MCNC: <0.2 MG/DL (ref 0–0.3)
BILIRUB INDIRECT SERPL-MCNC: NORMAL MG/DL
BILIRUB SERPL-MCNC: 0.3 MG/DL (ref 0–1.2)
CYTO UR: NORMAL
INR PPP: 1.03 (ref 0.86–1.15)
LAB AP CASE REPORT: NORMAL
LAB AP CLINICAL INFORMATION: NORMAL
LAB AP DIAGNOSIS COMMENT: NORMAL
PATH REPORT.FINAL DX SPEC: NORMAL
PATH REPORT.GROSS SPEC: NORMAL
PROT SERPL-MCNC: 6.7 G/DL (ref 6–8.5)
PROTHROMBIN TIME: 13.7 SECONDS (ref 11.8–14.9)

## 2023-11-21 PROCEDURE — 36415 COLL VENOUS BLD VENIPUNCTURE: CPT

## 2023-11-21 PROCEDURE — 86231 EMA EACH IG CLASS: CPT

## 2023-11-21 PROCEDURE — 85610 PROTHROMBIN TIME: CPT

## 2023-11-21 PROCEDURE — 80076 HEPATIC FUNCTION PANEL: CPT

## 2023-11-21 PROCEDURE — 86364 TISS TRNSGLTMNASE EA IG CLAS: CPT

## 2023-11-21 PROCEDURE — 82784 ASSAY IGA/IGD/IGG/IGM EACH: CPT

## 2023-11-27 LAB
ENDOMYSIUM IGA SER QL: NEGATIVE
IGA SERPL-MCNC: 136 MG/DL (ref 87–352)
TTG IGA SER-ACNC: <2 U/ML (ref 0–3)

## 2023-11-29 ENCOUNTER — TELEPHONE (OUTPATIENT)
Dept: GASTROENTEROLOGY | Facility: CLINIC | Age: 38
End: 2023-11-29
Payer: COMMERCIAL

## 2023-11-29 NOTE — TELEPHONE ENCOUNTER
With normal liver enzymes and no gallbladder, biliary etiology is unlikely ; Stomach ulcers and duodenitis can explain patient's symptoms, and Protonix can take some time to work.  Would recommend continuing Protonix and gluten-free diet, and if patient does not improve can order MRI MRCP.   If patient would like to try Carafate we can also try adding this to Protonix  Ensure no NSAIDs

## 2023-11-29 NOTE — TELEPHONE ENCOUNTER
"Patient returned call.    Spoke to patient and informed of LANDEN Brown result note and recommendations.  Verified patient understanding.     Confirmed patient is taking pantoprazole.  Educated why she is taking and how best to take PPI medications.  Patient verbalized understanding.    Discussed a trial of gluten-free foods for 2 months.  Patient did have labs drawn on 11.21.23, to which Celiac Panel resulted as normal:  patient aware of results last week.  Patient states she made sure to eat foods that had gluten in them prior to the lab test.  However, reports that otherwise has been on a gluten-free diet since October, without relief of any symptoms.      Patient reports discomfort in RUQ that wraps around to her back remains as previously discussed at last office visit on 11.14.23.  Rates pain as anywhere from a 1 to a 4/10 and \"constant\".  Patient feels as if her \"biliary system is inflamed\".  Patient requesting if there are any test that can be done to further evaluate her biliary system, \"without doing an ERCP.\"    Patient does have a follow up on 12.20.23.  Please advise of any further recommendations at this time.  "

## 2023-11-29 NOTE — TELEPHONE ENCOUNTER
Attempted to contact patient, left voicemail message to return call. Provided direct contact information.    Follow up appointment is scheduled with LANDEN Brown on 12.20.23 at 1315.

## 2023-11-29 NOTE — TELEPHONE ENCOUNTER
----- Message from LANDEN Puente sent at 11/21/2023  4:55 PM EST -----  EGD 11/20/2023: Normal esophagus, 3 nonbleeding cratered ulcers found in the stomach-biopsy with gastropathy otherwise negative, scalloped mucosa found in the second portion of the duodenum suspicious for celiac disease-biopsy with duodenitis and mild nonspecific villous blunting  recommend no NSAIDs, Protonix was prescribed    Colonoscopy 11/20/2023: Good bowel prep, internal hemorrhoids    Strongly recommend gluten-free diet, can also check celiac serology prior to starting diet changes

## 2023-11-30 NOTE — TELEPHONE ENCOUNTER
Attempted to contact patient, left voicemail message to return call. Provided direct contact information.

## 2023-11-30 NOTE — TELEPHONE ENCOUNTER
Patient returned call.  Informed of LANDEN Brown recommendations.  Patient states she has a script for Carafate at home and will trial this between now and her follow up appointment on 12.20.23 at 1315.    Reviewed pantoprazole does take several weeks to start to see improvement in symptoms, and that it increases in efficacy over 2-3 months.  Patient verbalized understanding.    Patient will review option of MRI MRCP at that appointment.

## 2023-12-20 ENCOUNTER — OFFICE VISIT (OUTPATIENT)
Dept: GASTROENTEROLOGY | Facility: CLINIC | Age: 38
End: 2023-12-20
Payer: COMMERCIAL

## 2023-12-20 VITALS
DIASTOLIC BLOOD PRESSURE: 72 MMHG | HEART RATE: 74 BPM | HEIGHT: 66 IN | WEIGHT: 187.6 LBS | SYSTOLIC BLOOD PRESSURE: 117 MMHG | BODY MASS INDEX: 30.15 KG/M2

## 2023-12-20 DIAGNOSIS — K59.04 CHRONIC IDIOPATHIC CONSTIPATION: ICD-10-CM

## 2023-12-20 DIAGNOSIS — K29.80 DUODENITIS: ICD-10-CM

## 2023-12-20 DIAGNOSIS — R10.11 RUQ PAIN: ICD-10-CM

## 2023-12-20 DIAGNOSIS — K25.9 GASTRIC ULCER WITHOUT HEMORRHAGE OR PERFORATION, UNSPECIFIED CHRONICITY: ICD-10-CM

## 2023-12-20 DIAGNOSIS — R63.0 DECREASED APPETITE: ICD-10-CM

## 2023-12-20 DIAGNOSIS — R14.0 ABDOMINAL BLOATING: Primary | ICD-10-CM

## 2023-12-20 RX ORDER — ENEMA 19; 7 G/133ML; G/133ML
ENEMA RECTAL ONCE
COMMUNITY

## 2023-12-20 NOTE — PROGRESS NOTES
Patient Name: Joelle Clement   Visit Date: 12/20/2023   Patient ID: [id number]  Provider: LANDEN Puente    Sex: female  Location:  Location Address:  Location Phone: [phone]  [place] [address] [phone]    YOB: 1985  Age: 38 y.o.   Primary Care Provider Jessica Abarca MD      Referring Provider: No ref. provider found        Chief Complaint  EGD/Colonoscopy (Follow up )    History of Present Illness    Patient initially presented 3/7/2022 with elevated liver enzymes and right upper quadrant discomfort.  ALT was 1000, . Also with heartburn and, decreased appetite, nausea  EGD was recommended however patient requested to proceed to general surgery regarding gallbladder  liver work-up unremarkable March 2022     Patient had gallbladder removed 3/16/2022 with a liver biopsy performed intraoperatively-liver biopsy showed acute hepatitis, no significant fibrosis, medication related and infectious etiologies possible     US abdomen 3/03/2022 mild hepatomegaly with subtle hyperechogenicity, contracted gallbladder with gallstones, no biliary dilation, no ascites     More recently, liver enzymes were normal January 2023, slight increase in ALT at 40 10/25/23, AST normal at 31, repeat 11/21/2023-ALT 22 and AST 21, INR 1.0    Pt last seen 11/14/23, reported after gallbladder removal she felt great.  Complained of decreased appetite abdominal bloating and discomfort after eating over the last month.  Felt constipation worsened since gallbladder removal w going 2 weeks without a bowel movement, reported with Linzess 145 mcg/day she has watery stool and cannot tolerate during working hours.  MiraLAX did not work for her, mag citrate works at times, reports trying everything over-the-counter.  She had lost 15 to 20 pounds with difficulty eating, H. pylori breath test negative October 2023  Motegrity was prescribed, insurance didn't cover, required Trulance or linzess  failure.     EGD 11/20/2023: Normal esophagus, 3 nonbleeding cratered ulcers found in the stomach-biopsy with gastropathy otherwise negative, scalloped mucosa found in the second portion of the duodenum suspicious for celiac disease-biopsy with duodenitis and mild nonspecific villous blunting  recommend no NSAIDs, Protonix was prescribed  Colonoscopy 11/20/2023: Good bowel prep, internal hemorrhoids  Celiac serology was negative     Pt states she's taking Linzess 145 mcg/d but still requiring fleet enema- states doesn't always use entire bottle but seems to require rectal stimulation first and bisacodyl 1-2 x week  Pt is not eating well d/t decreased appetite. Weight is stable.  RUQ pain is better and able to eat a little more since Protonix. No nausea.   Pt feels she was inadvertently on a gluten free diet d/t avoiding those foods w difficulty eating.  Pt was taking NSAIDs but stopped in October     Past Medical History:   Diagnosis Date    Allergic     seasonal only    Anemia     once as a child    Anxiety     Cholelithiasis     Chronic pain disorder     Depression     Disease of thyroid gland     THYROID NODULE- BX AND CONTINUE TO MONTIOR NODULES    Fatty liver 3/3/22    Per ultrasound, ELEVATED LIVER LEVELS PER PT. PT STATED MONITORED WITH LABS (REPEAT CMP TO BE DONE 3-11-22)    Gallstones     GERD (gastroesophageal reflux disease)     Occasional reflux for about a year    Headache     Headache, tension-type     History of medical problems Chronic Constipation    Joint pain     Tmj    Low back pain     Migraine     Neck pain     TMJ dysfunction March 2023       Past Surgical History:   Procedure Laterality Date    ADENOIDECTOMY      CHOLECYSTECTOMY N/A 03/16/2022    Procedure: LAPAROSCOPIC CHOLECYSTECTOMY;  Surgeon: Daniel Olivia MD;  [place] [facility] [place]  Service: General;  Laterality: N/A;    COLONOSCOPY N/A 11/20/2023    Procedure: COLONOSCOPY;  Surgeon: Donta Ott MD;  Location: [facility]  "ENDOSCOPY;  Service: Gastroenterology;  Laterality: N/A;  HEMORRHOIDS    ENDOSCOPY N/A 11/20/2023    Procedure: ESOPHAGOGASTRODUODENOSCOPY WITH BIOPSIES;  Surgeon: Donta Ott MD;  Location: [facility] ENDOSCOPY;  Service: Gastroenterology;  Laterality: N/A;  GASTRIC ULCER, GASTRITIS, POSSIBLE CELIAC DISEASE    LIVER BIOPSY N/A 03/16/2022    Procedure: LIVER BIOPSY;  Surgeon: Daniel Olivia MD;  [place] [facility] [place] [facility] [place]  Service: General;  Laterality: N/A;    TONSILLECTOMY      US GUIDED FINE NEEDLE ASPIRATION  01/12/2021       No Known Allergies    Family History   Problem Relation Age of Onset    Hyperlipidemia Mother     Thyroid disease Mother     Depression Father     Colon cancer Maternal Uncle         unknown age of onset    Cancer Maternal Uncle         colon cancer    Cancer Paternal Aunt         bone cancer    Diabetes Maternal Grandmother     Cancer Paternal Grandfather         lung cancer    Malig Hyperthermia Neg Hx         Social History     Tobacco Use    Smoking status: Never    Smokeless tobacco: Never   Vaping Use    Vaping Use: Never used   Substance Use Topics    Alcohol use: Never    Drug use: Never       Objective     Vital Signs:   /72 (BP Location: Right arm, Patient Position: Sitting, Cuff Size: Adult)   Pulse 74   Ht 167.6 cm (66\")   Wt 85.1 kg (187 lb 9.6 oz)   BMI 30.28 kg/m²       Physical Exam  Constitutional:       General: The patient is not in acute distress.     Appearance: Normal appearance.   HENT:      Head: Normocephalic and atraumatic.      Nose: Nose normal.   Pulmonary:      Effort: Pulmonary effort is normal. No respiratory distress.   Abdominal:      General: Abdomen is flat.      Palpations: Abdomen is soft. There is no mass.      Tenderness: There is no abdominal tenderness. There is no guarding.   Musculoskeletal:      Cervical back: Neck supple.      Right lower leg: No edema.      Left lower leg: No edema.   Skin:     General: Skin is warm and dry. "   Neurological:      General: No focal deficit present.      Mental Status: The patient is alert and oriented to person, place, and time.      Gait: Gait normal.   Psychiatric:         Mood and Affect: Mood normal.         Speech: Speech normal.         Behavior: Behavior normal.         Thought Content: Thought content normal.     Result Review :   The following data was reviewed by: LANDEN Puente on 12/20/2023:    CBC w/diff          1/19/2023    07:35 10/25/2023    11:10   CBC w/Diff   WBC 7.79  8.93    RBC 5.01  5.27    Hemoglobin 13.3  14.9    Hematocrit 39.8  44.3    MCV 79.4  84.1    MCH 26.5  28.3    MCHC 33.4  33.6    RDW 13.4  12.7    Platelets 469  410    Neutrophil Rel % 53.7  56.3    Immature Granulocyte Rel % 0.1  0.1    Lymphocyte Rel % 31.7  23.6    Monocyte Rel % 5.9  5.6    Eosinophil Rel % 7.8  13.7    Basophil Rel % 0.8  0.7      CMP          1/19/2023    07:35 10/25/2023    11:10 11/21/2023    17:41   CMP   Glucose 81  78     BUN 16  15     Creatinine 0.75  0.83     EGFR 105.3  92.7     Sodium 137  139     Potassium 4.1  4.7     Chloride 106  102     Calcium 9.3  9.9     Total Protein 6.9  6.8  6.7    Albumin 4.4  4.7  4.9    Globulin 2.5  2.1     Total Bilirubin 0.3  0.5  0.3    Alkaline Phosphatase 70  82  70    AST (SGOT) 23  31  21    ALT (SGPT) 21  40  22    Albumin/Globulin Ratio 1.8  2.2     BUN/Creatinine Ratio 21.3  18.1     Anion Gap 8.0  9.0                     Assessment and Plan    Diagnoses and all orders for this visit:    1. Abdominal bloating (Primary)    2. RUQ pain  Comments:  resolved    3. Decreased appetite    4. Gastric ulcer without hemorrhage or perforation, unspecified chronicity    5. Duodenitis  Comments:  ? celiac  celiac serology negative    6. Chronic idiopathic constipation              Follow Up   Return in about 3 months (around 3/20/2024).  With Dr. Ott  -Patient does not feel she has a gluten problem and does not want to do a gluten-free  trial.  She has remained off NSAIDs, she will continue Protonix 40 mg/day for now.  -I discussed with her checking anorectal manometry as she describes issues with passing stool and needing rectal stimulation for bowel movement, patient would like to wait on any other testing at this time due to financial reasons.    -I recommended adding Colace twice daily and Benefiber daily to Linzess and take Linzess with food to see if this would better optimize her bowel regimen, try to avoid enema use.  Patient a little concerned about starting Motegrity.  Per insurance she would also have to fail Trulance first  -I also had recommended to patient repeat EGD to ensure healing of gastric ulcers as well as repeating duodenal exam but patient has declined for now.  -I also discussed checking gastric emptying scan due to complaints of decreased appetite but she would like to wait and trial the diet. Patient is already mainly having small frequent meals and trying some protein shakes.      Patient was given instructions and counseling regarding her condition or for health maintenance advice. Please see specific information pulled into the AVS if appropriate.

## 2024-03-19 ENCOUNTER — TELEPHONE (OUTPATIENT)
Dept: GASTROENTEROLOGY | Facility: CLINIC | Age: 39
End: 2024-03-19
Payer: COMMERCIAL

## 2024-03-19 NOTE — TELEPHONE ENCOUNTER
Called and spoke w/patient and she isn't ready to reschedule. Will call and reschedule as soon as she can.

## 2024-03-19 NOTE — TELEPHONE ENCOUNTER
----- Message from Joelle Clement sent at 3/17/2024  8:38 PM EDT -----  Regarding: Appointment canceled  Contact: [phone]  Appointment canceled for Joelle Clement ([id number])  Visit Type: FOLLOW UP  Date        Time      Length    Provider                  Department  3/20/2024    3:15 PM  15 mins.  Donta Ott               [facility] [place]    Reason for Cancellation: Other    Patient Comments: Unable to get off work

## 2024-03-20 ENCOUNTER — OFFICE VISIT (OUTPATIENT)
Dept: FAMILY MEDICINE CLINIC | Age: 39
End: 2024-03-20
Payer: COMMERCIAL

## 2024-03-20 VITALS
WEIGHT: 179.2 LBS | SYSTOLIC BLOOD PRESSURE: 96 MMHG | HEIGHT: 66 IN | OXYGEN SATURATION: 96 % | HEART RATE: 82 BPM | BODY MASS INDEX: 28.8 KG/M2 | TEMPERATURE: 98.9 F | DIASTOLIC BLOOD PRESSURE: 69 MMHG

## 2024-03-20 DIAGNOSIS — R73.9 HYPERGLYCEMIA: ICD-10-CM

## 2024-03-20 DIAGNOSIS — R39.15 URINARY URGENCY: Primary | ICD-10-CM

## 2024-03-20 LAB
BACTERIA UR QL AUTO: NORMAL /HPF
BILIRUB UR QL STRIP: NEGATIVE
CLARITY UR: CLEAR
COLOR UR: ABNORMAL
GLUCOSE UR STRIP-MCNC: NEGATIVE MG/DL
HGB UR QL STRIP.AUTO: NEGATIVE
KETONES UR QL STRIP: NEGATIVE
LEUKOCYTE ESTERASE UR QL STRIP.AUTO: NEGATIVE
MUCOUS THREADS URNS QL MICRO: NORMAL /HPF
NITRITE UR QL STRIP: NEGATIVE
PH UR STRIP.AUTO: 5.5 [PH] (ref 5–8)
PROT UR QL STRIP: NEGATIVE
RBC # UR STRIP: NORMAL /HPF
REF LAB TEST METHOD: NORMAL
SP GR UR STRIP: 1.01 (ref 1–1.03)
SQUAMOUS #/AREA URNS HPF: NORMAL /HPF
UROBILINOGEN UR QL STRIP: ABNORMAL
WBC # UR STRIP: NORMAL /HPF

## 2024-03-20 PROCEDURE — 99213 OFFICE O/P EST LOW 20 MIN: CPT | Performed by: FAMILY MEDICINE

## 2024-03-20 PROCEDURE — 81001 URINALYSIS AUTO W/SCOPE: CPT | Performed by: FAMILY MEDICINE

## 2024-03-20 NOTE — PROGRESS NOTES
"Chief Complaint  Urinary Urgency    Subjective          Joelle Clement presents to [facility] today for acute complaint of urinary urgency for the past 3 days.  She reports negative dysuria, negative hematuria, positive frequency, positive urgency, +small volume voids.  +Vaginal pressure.  She reports negative fevers, negative chills, negative nausea, negative vomiting, negative suprapubic abdominal pain, negative back pain, negative flank pain.  She has not had UTIs in the past.  She has been using Azo (last dose yesterday 2:30).  She has noticed increased in discharge since stopping birth control back in October (back to pre-ABDI levels).  No thick white discharge or change in color/consistency recently.       Objective   Vital Signs:   Vitals:    03/20/24 0855   BP: 96/69   BP Location: Left arm   Patient Position: Sitting   Cuff Size: Large Adult   Pulse: 82   Temp: 98.9 °F (37.2 °C)   TempSrc: Oral   SpO2: 96%   Weight: 81.3 kg (179 lb 3.2 oz)   Height: 167.6 cm (65.98\")      BMI is >= 25 and <30. (Overweight) The following options were offered after discussion;: exercise counseling/recommendations and nutrition counseling/recommendations      Physical Exam  Vitals reviewed.   Constitutional:       General: She is not in acute distress.     Appearance: Normal appearance.   HENT:      Mouth/Throat:      Mouth: Mucous membranes are moist.   Eyes:      Extraocular Movements: Extraocular movements intact.      Pupils: Pupils are equal, round, and reactive to light.   Cardiovascular:      Rate and Rhythm: Normal rate and regular rhythm.      Heart sounds: No murmur heard.  Pulmonary:      Effort: Pulmonary effort is normal.      Breath sounds: Normal breath sounds. No wheezing, rhonchi or rales.   Abdominal:      Palpations: Abdomen is soft.      Tenderness: There is no abdominal tenderness (INCLUDING SUPRAPUBIC AREA). There is no right CVA tenderness, left CVA tenderness, guarding " or rebound.   Musculoskeletal:         General: Normal range of motion.   Neurological:      Mental Status: She is alert.             Assessment and Plan    Assessment & Plan  Urinary urgency  Checking UA with micro today.  She has been off the Azo for almost 24 hours.  No evidence of complicated infection on physical exam.  Stay well-hydrated.  May use Azo for symptomatic control.  Call or return to clinic as needed worsening or failure to improve of symptoms.    Hyperglycemia  Consider checking A1c if UA comes back without evidence of infection.                Follow Up   No follow-ups on file.  Patient was given instructions and counseling regarding her condition or for health maintenance advice. Please see specific information pulled into the AVS if appropriate.         03/20/2024

## 2024-08-08 ENCOUNTER — OFFICE VISIT (OUTPATIENT)
Dept: FAMILY MEDICINE CLINIC | Age: 39
End: 2024-08-08
Payer: COMMERCIAL

## 2024-08-08 VITALS
TEMPERATURE: 98.1 F | HEIGHT: 66 IN | SYSTOLIC BLOOD PRESSURE: 108 MMHG | OXYGEN SATURATION: 100 % | WEIGHT: 179.8 LBS | DIASTOLIC BLOOD PRESSURE: 67 MMHG | HEART RATE: 89 BPM | BODY MASS INDEX: 28.9 KG/M2

## 2024-08-08 DIAGNOSIS — R20.2 NUMBNESS AND TINGLING OF LEFT LEG: ICD-10-CM

## 2024-08-08 DIAGNOSIS — R20.0 NUMBNESS AND TINGLING OF LEFT LEG: ICD-10-CM

## 2024-08-08 DIAGNOSIS — G89.29 CHRONIC LEFT-SIDED LOW BACK PAIN WITH LEFT-SIDED SCIATICA: Primary | ICD-10-CM

## 2024-08-08 DIAGNOSIS — M54.42 CHRONIC LEFT-SIDED LOW BACK PAIN WITH LEFT-SIDED SCIATICA: Primary | ICD-10-CM

## 2024-08-08 PROBLEM — R68.81 EARLY SATIETY: Status: RESOLVED | Noted: 2023-11-14 | Resolved: 2024-08-08

## 2024-08-08 PROBLEM — R63.4 WEIGHT LOSS: Status: RESOLVED | Noted: 2023-11-14 | Resolved: 2024-08-08

## 2024-08-08 PROBLEM — R10.10 UPPER ABDOMINAL PAIN: Status: RESOLVED | Noted: 2022-03-01 | Resolved: 2024-08-08

## 2024-08-08 PROBLEM — R53.83 OTHER FATIGUE: Status: RESOLVED | Noted: 2023-01-18 | Resolved: 2024-08-08

## 2024-08-08 PROBLEM — R10.11 RUQ PAIN: Status: RESOLVED | Noted: 2023-11-14 | Resolved: 2024-08-08

## 2024-08-08 PROBLEM — R14.0 ABDOMINAL BLOATING: Status: RESOLVED | Noted: 2023-11-14 | Resolved: 2024-08-08

## 2024-08-08 PROCEDURE — 99213 OFFICE O/P EST LOW 20 MIN: CPT | Performed by: FAMILY MEDICINE

## 2024-08-08 NOTE — PROGRESS NOTES
"Chief Complaint  Back Pain (Herniated disc)    Subjective          Joelle Clement presents to [facility] today for back pain.    Joelle has longstanding history of mid to low back pain.  She was first diagnosed with a T12-L1 herniated disc by MRI back in 2017.  She has done PT in the past with a complete course including dry needling.  That helped for a while.      Starting back in the beginning of June (about 6 weeks ago), she started to notice pain to the upper low back.  Prior to this, she was shaving her horse's winter coat.  That night, when she got in to the shower, she felt pain in the area of the upper low back where the shower hit her.  She has a feeling of pinching or pressure like pain in the same area with any bending.  She sometimes feels pins-and-needles or \"bee sting \"sensations all over the body.  Beginning at the end of July, she noted that her left leg suddenly went numb, strength the hip with involvement all the way down to the left foot.  The numbness is worst when she sits for more than 5 minutes.  No problems with balance.  At times she feels her left arm go slightly numb.  She feels best when she is either standing and walking or lying down.  No loss of control of bowel or bladder.  She does feel rectal pressure for the past couple of days.  This is somewhat constant.  She has been working on her posture and alternating ice and heat with her hot tub.  She has also been using Flexeril and Lyrica for the past week as well as doing PT exercises and using a TENS unit.  Despite this, she has had no notable relief.      Current Outpatient Medications:     Cholecalciferol (Vitamin D-3) 125 MCG (5000 UT) tablet, Daily., Disp: , Rfl:     linaclotide (Linzess) 145 MCG capsule capsule, Take 1 capsule by mouth Every Morning Before Breakfast., Disp: 90 capsule, Rfl: 1    MAGNESIUM GLYCINATE PO, Daily., Disp: , Rfl:     multivitamin with minerals (Multivitamin Adults) tablet " "tablet, Take  by mouth Daily., Disp: , Rfl:     Omega-3 Fatty Acids (OMEGA-3 FISH OIL PO), , Disp: , Rfl:   Medications Discontinued During This Encounter   Medication Reason    lactulose (CHRONULAC) 10 GM/15ML solution Historical Med - Therapy completed    pantoprazole (PROTONIX) 40 MG EC tablet Historical Med - Therapy completed         Allergies:  Patient has no known allergies.      Objective   Vital Signs:   Vitals:    08/08/24 1013   BP: 108/67   Pulse: 89   Temp: 98.1 °F (36.7 °C)   TempSrc: Oral   SpO2: 100%   Weight: 81.6 kg (179 lb 12.8 oz)   Height: 167.6 cm (65.98\")     Body mass index is 29.03 kg/m².           Physical Exam  Constitutional:       Appearance: Normal appearance.   HENT:      Head: Normocephalic and atraumatic.   Eyes:      Extraocular Movements: Extraocular movements intact.      Conjunctiva/sclera: Conjunctivae normal.   Pulmonary:      Effort: Pulmonary effort is normal. No respiratory distress.   Musculoskeletal:         General: Normal range of motion.   Skin:     General: Skin is warm and dry.   Neurological:      General: No focal deficit present.      Mental Status: She is alert and oriented to person, place, and time.      Deep Tendon Reflexes: Reflexes normal.   Psychiatric:         Mood and Affect: Mood normal.         Behavior: Behavior normal.         Thought Content: Thought content normal.         Judgment: Judgment normal.           Lab Results   Component Value Date    GLUCOSE 78 10/25/2023    BUN 15 10/25/2023    CREATININE 0.83 10/25/2023    EGFRIFNONA 76 09/24/2021    BCR 18.1 10/25/2023    K 4.7 10/25/2023    CO2 28.0 10/25/2023    CALCIUM 9.9 10/25/2023    ALBUMIN 4.9 11/21/2023    LABIL2 1.6 09/08/2020    AST 21 11/21/2023    ALT 22 11/21/2023       Lab Results   Component Value Date    CHOL 172 01/19/2023    CHLPL 162 09/08/2020    TRIG 89 01/19/2023    HDL 38 (L) 01/19/2023     (H) 01/19/2023       Lab Results   Component Value Date    WBC 8.93 10/25/2023    " HGB 14.9 10/25/2023    HCT 44.3 10/25/2023    MCV 84.1 10/25/2023     10/25/2023            Assessment and Plan    Assessment & Plan  Chronic left-sided low back pain with left-sided sciatica  Acute on chronic left-sided back pain with new onset sciatica for the past 6 weeks.  She has been trying conservative measures at home without significant relief of her symptoms.  She is describing some upper extremity discomfort as well but that sounds like it may be more related to a cubital tunnel syndrome of the left elbow.  For now, we will focus on getting the low back feeling better.  Proceeding with MRI without contrast of L-spine.  Okay to use NSAIDs judiciously.  She had some gastritis diagnosed earlier in the year, but she is no longer requiring any meds for this, so occasional use of NSAIDs should be fine.  She may continue to use Lyrica and cyclobenzaprine as needed as well.  Numbness and tingling of left leg  As above.    Orders Placed This Encounter   Procedures    MRI Lumbar Spine Without Contrast              Follow Up   Return if symptoms worsen or fail to improve.  Patient was given instructions and counseling regarding her condition or for health maintenance advice. Please see specific information pulled into the AVS if appropriate.           08/08/2024

## 2024-08-08 NOTE — ASSESSMENT & PLAN NOTE
Acute on chronic left-sided back pain with new onset sciatica for the past 6 weeks.  She has been trying conservative measures at home without significant relief of her symptoms.  She is describing some upper extremity discomfort as well but that sounds like it may be more related to a cubital tunnel syndrome of the left elbow.  For now, we will focus on getting the low back feeling better.  Proceeding with MRI without contrast of L-spine.  Okay to use NSAIDs judiciously.  She had some gastritis diagnosed earlier in the year, but she is no longer requiring any meds for this, so occasional use of NSAIDs should be fine.  She may continue to use Lyrica and cyclobenzaprine as needed as well.

## 2024-08-26 ENCOUNTER — HOSPITAL ENCOUNTER (OUTPATIENT)
Dept: MRI IMAGING | Facility: HOSPITAL | Age: 39
Discharge: HOME | End: 2024-08-26
Admitting: FAMILY MEDICINE
Payer: COMMERCIAL

## 2024-08-26 DIAGNOSIS — R20.2 NUMBNESS AND TINGLING OF LEFT LEG: ICD-10-CM

## 2024-08-26 DIAGNOSIS — R20.0 NUMBNESS AND TINGLING OF LEFT LEG: ICD-10-CM

## 2024-08-26 DIAGNOSIS — G89.29 CHRONIC LEFT-SIDED LOW BACK PAIN WITH LEFT-SIDED SCIATICA: ICD-10-CM

## 2024-08-26 DIAGNOSIS — M54.42 CHRONIC LEFT-SIDED LOW BACK PAIN WITH LEFT-SIDED SCIATICA: ICD-10-CM

## 2024-08-26 PROCEDURE — 72148 MRI LUMBAR SPINE W/O DYE: CPT

## 2024-08-26 NOTE — TELEPHONE ENCOUNTER
Pt is on Amoxicillin TID X 10 days for a dental infection and it is making her so nauseous.  She is requesting an rx for zofran 8mg to be sent in to the pharm.  Please advise.   [Fever] : no fever [Chills] : no chills [Muscle Weakness] : no muscle weakness [[id number]] : intermittent left lower leg pain near the ankle that is healing

## 2024-08-29 ENCOUNTER — TELEPHONE (OUTPATIENT)
Dept: FAMILY MEDICINE CLINIC | Age: 39
End: 2024-08-29
Payer: COMMERCIAL

## 2024-08-29 DIAGNOSIS — M54.2 CERVICALGIA: ICD-10-CM

## 2024-08-29 DIAGNOSIS — M54.42 CHRONIC LEFT-SIDED LOW BACK PAIN WITH LEFT-SIDED SCIATICA: Primary | ICD-10-CM

## 2024-08-29 DIAGNOSIS — G89.29 CHRONIC LEFT-SIDED LOW BACK PAIN WITH LEFT-SIDED SCIATICA: Primary | ICD-10-CM

## 2024-08-29 NOTE — TELEPHONE ENCOUNTER
Discussed results of MRI L-spine.  I think the chances of her requiring surgery extremely low given that she is now managing the pain quite well and numbness has resolved.  Therefore, we will hold off on referring to Neurosurgery at this time and is to get her in with Physical Therapy.  I will be happy to refill the muscle relaxer she has at home when that is needed.  Thanks, TONY

## 2024-11-26 ENCOUNTER — TELEPHONE (OUTPATIENT)
Dept: FAMILY MEDICINE CLINIC | Age: 39
End: 2024-11-26
Payer: COMMERCIAL

## 2024-11-26 RX ORDER — CYCLOBENZAPRINE HCL 10 MG
10 TABLET ORAL NIGHTLY PRN
Qty: 30 TABLET | Refills: 0 | Status: SHIPPED | OUTPATIENT
Start: 2024-11-26

## 2025-06-25 RX ORDER — CYCLOBENZAPRINE HCL 10 MG
10 TABLET ORAL NIGHTLY PRN
Qty: 90 TABLET | Refills: 0 | Status: SHIPPED | OUTPATIENT
Start: 2025-06-25

## 2025-07-31 ENCOUNTER — OFFICE VISIT (OUTPATIENT)
Dept: FAMILY MEDICINE CLINIC | Age: 40
End: 2025-07-31
Payer: COMMERCIAL

## 2025-07-31 VITALS
HEIGHT: 66 IN | HEART RATE: 76 BPM | DIASTOLIC BLOOD PRESSURE: 66 MMHG | BODY MASS INDEX: 27.03 KG/M2 | OXYGEN SATURATION: 98 % | TEMPERATURE: 98.1 F | WEIGHT: 168.2 LBS | SYSTOLIC BLOOD PRESSURE: 100 MMHG

## 2025-07-31 DIAGNOSIS — R31.9 URINARY TRACT INFECTION WITH HEMATURIA, SITE UNSPECIFIED: Primary | ICD-10-CM

## 2025-07-31 DIAGNOSIS — R39.15 URINARY URGENCY: ICD-10-CM

## 2025-07-31 DIAGNOSIS — B37.31 VAGINAL CANDIDA: ICD-10-CM

## 2025-07-31 DIAGNOSIS — R30.0 DYSURIA: ICD-10-CM

## 2025-07-31 DIAGNOSIS — N39.0 URINARY TRACT INFECTION WITH HEMATURIA, SITE UNSPECIFIED: Primary | ICD-10-CM

## 2025-07-31 DIAGNOSIS — R35.0 URINARY FREQUENCY: ICD-10-CM

## 2025-07-31 LAB
BACTERIA UR QL AUTO: ABNORMAL /HPF
BILIRUB UR QL STRIP: NEGATIVE
CLARITY UR: ABNORMAL
COLOR UR: YELLOW
GLUCOSE UR STRIP-MCNC: NEGATIVE MG/DL
HGB UR QL STRIP.AUTO: ABNORMAL
KETONES UR QL STRIP: NEGATIVE
LEUKOCYTE ESTERASE UR QL STRIP.AUTO: ABNORMAL
MUCOUS THREADS URNS QL MICRO: ABNORMAL /HPF
NITRITE UR QL STRIP: POSITIVE
PH UR STRIP.AUTO: 5.5 [PH] (ref 5–8)
PROT UR QL STRIP: NEGATIVE
RBC # UR STRIP: ABNORMAL /HPF
REF LAB TEST METHOD: ABNORMAL
SP GR UR STRIP: 1.02 (ref 1–1.03)
SQUAMOUS #/AREA URNS HPF: ABNORMAL /HPF
UROBILINOGEN UR QL STRIP: ABNORMAL
WBC # UR STRIP: ABNORMAL /HPF

## 2025-07-31 PROCEDURE — 87086 URINE CULTURE/COLONY COUNT: CPT | Performed by: STUDENT IN AN ORGANIZED HEALTH CARE EDUCATION/TRAINING PROGRAM

## 2025-07-31 PROCEDURE — 81001 URINALYSIS AUTO W/SCOPE: CPT | Performed by: STUDENT IN AN ORGANIZED HEALTH CARE EDUCATION/TRAINING PROGRAM

## 2025-07-31 PROCEDURE — 99214 OFFICE O/P EST MOD 30 MIN: CPT | Performed by: STUDENT IN AN ORGANIZED HEALTH CARE EDUCATION/TRAINING PROGRAM

## 2025-07-31 RX ORDER — NITROFURANTOIN 25; 75 MG/1; MG/1
100 CAPSULE ORAL 2 TIMES DAILY
Qty: 10 CAPSULE | Refills: 0 | Status: SHIPPED | OUTPATIENT
Start: 2025-07-31 | End: 2025-08-05

## 2025-07-31 RX ORDER — FLUCONAZOLE 150 MG/1
150 TABLET ORAL ONCE
Qty: 1 TABLET | Refills: 0 | Status: SHIPPED | OUTPATIENT
Start: 2025-07-31 | End: 2025-08-01

## 2025-08-03 LAB — BACTERIA SPEC AEROBE CULT: NO GROWTH

## 2025-08-04 DIAGNOSIS — R30.0 DYSURIA: Primary | ICD-10-CM

## 2025-08-04 DIAGNOSIS — R35.0 URINARY FREQUENCY: ICD-10-CM

## 2025-08-04 DIAGNOSIS — R39.15 URINARY URGENCY: ICD-10-CM

## 2025-08-04 RX ORDER — AMITRIPTYLINE HYDROCHLORIDE 10 MG/1
10 TABLET ORAL NIGHTLY
Qty: 30 TABLET | Refills: 1 | Status: SHIPPED | OUTPATIENT
Start: 2025-08-04

## 2025-08-21 ENCOUNTER — OFFICE VISIT (OUTPATIENT)
Dept: FAMILY MEDICINE CLINIC | Age: 40
End: 2025-08-21
Payer: COMMERCIAL

## 2025-08-21 VITALS
TEMPERATURE: 98.1 F | WEIGHT: 168 LBS | BODY MASS INDEX: 27 KG/M2 | SYSTOLIC BLOOD PRESSURE: 94 MMHG | HEART RATE: 76 BPM | HEIGHT: 66 IN | OXYGEN SATURATION: 98 % | DIASTOLIC BLOOD PRESSURE: 62 MMHG

## 2025-08-21 DIAGNOSIS — M79.602 PAIN IN LEFT ARM: ICD-10-CM

## 2025-08-21 DIAGNOSIS — Z00.00 ANNUAL PHYSICAL EXAM: Primary | ICD-10-CM

## 2025-08-21 DIAGNOSIS — R20.2 PARESTHESIA OF LEFT ARM: ICD-10-CM

## 2025-08-21 DIAGNOSIS — Z12.31 SCREENING MAMMOGRAM FOR BREAST CANCER: ICD-10-CM

## 2025-08-21 DIAGNOSIS — K59.04 CHRONIC IDIOPATHIC CONSTIPATION: ICD-10-CM

## (undated) DEVICE — ENDOPATH XCEL WITH OPTIVIEW TECHNOLOGY UNIVERSAL TROCAR STABILITY SLEEVES: Brand: ENDOPATH XCEL OPTIVIEW

## (undated) DEVICE — SUT SILK 2/0 TIES 18IN A185H

## (undated) DEVICE — CONN JET HYDRA H20 AUXILIARY DISP

## (undated) DEVICE — ENDOPATH XCEL WITH OPTIVIEW TECHNOLOGY BLADELESS TROCARS WITH STABILITY SLEEVES: Brand: ENDOPATH XCEL OPTIVIEW

## (undated) DEVICE — MAJOR-LF: Brand: MEDLINE INDUSTRIES, INC.

## (undated) DEVICE — GLV SURG SENSICARE PI ORTHO SZ8 LF STRL

## (undated) DEVICE — GAUZE,SPONGE,4"X4",16PLY,STRL,LF,10/TRAY: Brand: MEDLINE

## (undated) DEVICE — SLV SCD KN/LEN ADJ EXPRSS BLENDED MD 1P/U

## (undated) DEVICE — 2, DISPOSABLE SUCTION/IRRIGATOR WITHOUT DISPOSABLE TIP: Brand: STRYKEFLOW

## (undated) DEVICE — APPL CHLORAPREP HI/LITE 26ML ORNG

## (undated) DEVICE — INTENDED FOR TISSUE SEPARATION, AND OTHER PROCEDURES THAT REQUIRE A SHARP SURGICAL BLADE TO PUNCTURE OR CUT.: Brand: BARD-PARKER ® CARBON RIB-BACK BLADES

## (undated) DEVICE — DRSNG TELFA PAD NONADH STR 1S 3X4IN

## (undated) DEVICE — ANTIBACTERIAL VIOLET BRAIDED (POLYGLACTIN 910), SYNTHETIC ABSORBABLE SUTURE: Brand: COATED VICRYL

## (undated) DEVICE — SOLIDIFIER LIQLOC PLS 1500CC BT

## (undated) DEVICE — MAX-CORE® DISPOSABLE CORE BIOPSY INSTRUMENT, 18G X 20CM: Brand: MAX-CORE

## (undated) DEVICE — STERILE POLYISOPRENE POWDER-FREE SURGICAL GLOVES WITH EMOLLIENT COATING: Brand: PROTEXIS

## (undated) DEVICE — SOL IRR NACL 0.9PCT 3000ML

## (undated) DEVICE — NON-WOVEN ADHESIVE WOUND DRESSING: Brand: PRIMAPORE ADHESIVE DRESSING 10*8CM

## (undated) DEVICE — Device

## (undated) DEVICE — SUT PDS 1 XLH LP 99IN Z881G

## (undated) DEVICE — LAPAROSCOPIC SCISSORS: Brand: EPIX LAPAROSCOPIC SCISSORS

## (undated) DEVICE — Device: Brand: DEFENDO AIR/WATER/SUCTION AND BIOPSY VALVE

## (undated) DEVICE — ABSORBABLE HEMOSTAT (OXIDIZED REGENERATED CELLULOSE)
Type: IMPLANTABLE DEVICE | Site: ABDOMEN | Status: NON-FUNCTIONAL
Brand: SURGICEL NU-KNIT
Removed: 2022-03-16

## (undated) DEVICE — TISSUE RETRIEVAL SYSTEM: Brand: INZII RETRIEVAL SYSTEM

## (undated) DEVICE — SUT MERSILENE POLYSTR CT1 BR 0 75CM GRN

## (undated) DEVICE — NON-WOVEN ADHESIVE WOUND DRESSING: Brand: PRIMAPORE ADHESIVE WOUND DRSG 7.2*5CM

## (undated) DEVICE — VISUALIZATION SYSTEM: Brand: CLEARIFY

## (undated) DEVICE — ENDOPATH PNEUMONEEDLE INSUFFLATION NEEDLES WITH LUER LOCK CONNECTORS 120MM: Brand: ENDOPATH

## (undated) DEVICE — SYS CLOSE PORTII CARTR/THOMASN XL

## (undated) DEVICE — ANTIBACTERIAL UNDYED BRAIDED (POLYGLACTIN 910), SYNTHETIC ABSORBABLE SUTURE: Brand: COATED VICRYL

## (undated) DEVICE — SINGLE-USE BIOPSY FORCEPS: Brand: RADIAL JAW 4

## (undated) DEVICE — GENERAL LAPAROSCOPY-LF: Brand: MEDLINE INDUSTRIES, INC.

## (undated) DEVICE — SOL IRRG H2O PL/BG 1000ML STRL

## (undated) DEVICE — BLCK/BITE BLOX WO/DENTL/RIM W/STRAP 54F

## (undated) DEVICE — LINER SURG CANSTR SXN S/RIGD 1500CC

## (undated) DEVICE — 3M™ STERI-STRIP™ REINFORCED ADHESIVE SKIN CLOSURES, R1547, 1/2 IN X 4 IN (12 MM X 100 MM), 6 STRIPS/ENVELOPE: Brand: 3M™ STERI-STRIP™

## (undated) DEVICE — PENCL E/S SMOKEEVAC W/TELESCP CANN

## (undated) DEVICE — LAPAROSCOPIC WIRE J-HOOK ELECTRODE COATED: Brand: CLEANCOAT

## (undated) DEVICE — GOWN,REINFRCE,POLY,SIRUS,BREATH SLV,XXLG: Brand: MEDLINE